# Patient Record
Sex: FEMALE | Race: WHITE | NOT HISPANIC OR LATINO | Employment: FULL TIME | URBAN - METROPOLITAN AREA
[De-identification: names, ages, dates, MRNs, and addresses within clinical notes are randomized per-mention and may not be internally consistent; named-entity substitution may affect disease eponyms.]

---

## 2017-02-10 DIAGNOSIS — Z30.41 SURVEILLANCE OF PREVIOUSLY PRESCRIBED CONTRACEPTIVE PILL: Primary | ICD-10-CM

## 2017-02-10 RX ORDER — ACETAMINOPHEN AND CODEINE PHOSPHATE 120; 12 MG/5ML; MG/5ML
1 SOLUTION ORAL DAILY
Qty: 90 TABLET | Refills: 3 | Status: SHIPPED
Start: 2017-02-10 | End: 2017-12-21

## 2017-02-10 NOTE — TELEPHONE ENCOUNTER
Received refill request for POP.  Last in clinic 12/2016 and PAP done.   Plan was for her to f/u in 3 months, considering Nuvaring or Nexplanon.  POP refill done per  protocol

## 2017-05-11 ENCOUNTER — APPOINTMENT (OUTPATIENT)
Dept: GENERAL RADIOLOGY | Facility: CLINIC | Age: 32
End: 2017-05-11
Attending: EMERGENCY MEDICINE
Payer: COMMERCIAL

## 2017-05-11 ENCOUNTER — HOSPITAL ENCOUNTER (EMERGENCY)
Facility: CLINIC | Age: 32
Discharge: HOME | End: 2017-05-11
Attending: EMERGENCY MEDICINE | Admitting: EMERGENCY MEDICINE
Payer: COMMERCIAL

## 2017-05-11 VITALS
HEIGHT: 65 IN | DIASTOLIC BLOOD PRESSURE: 84 MMHG | TEMPERATURE: 99.1 F | OXYGEN SATURATION: 100 % | RESPIRATION RATE: 14 BRPM | WEIGHT: 200 LBS | BODY MASS INDEX: 33.32 KG/M2 | SYSTOLIC BLOOD PRESSURE: 133 MMHG

## 2017-05-11 DIAGNOSIS — S42.002A CLOSED DISPLACED FRACTURE OF LEFT CLAVICLE, UNSPECIFIED PART OF CLAVICLE, INITIAL ENCOUNTER: ICD-10-CM

## 2017-05-11 PROCEDURE — 25000132 ZZH RX MED GY IP 250 OP 250 PS 637: Performed by: EMERGENCY MEDICINE

## 2017-05-11 PROCEDURE — 72040 X-RAY EXAM NECK SPINE 2-3 VW: CPT

## 2017-05-11 PROCEDURE — 73000 X-RAY EXAM OF COLLAR BONE: CPT | Mod: LT

## 2017-05-11 PROCEDURE — 73080 X-RAY EXAM OF ELBOW: CPT | Mod: LT

## 2017-05-11 PROCEDURE — 99285 EMERGENCY DEPT VISIT HI MDM: CPT

## 2017-05-11 RX ORDER — OXYCODONE AND ACETAMINOPHEN 5; 325 MG/1; MG/1
1-2 TABLET ORAL EVERY 4 HOURS PRN
Qty: 20 TABLET | Refills: 0 | Status: SHIPPED | OUTPATIENT
Start: 2017-05-11 | End: 2017-12-21

## 2017-05-11 RX ORDER — IBUPROFEN 600 MG/1
600 TABLET, FILM COATED ORAL ONCE
Status: COMPLETED | OUTPATIENT
Start: 2017-05-11 | End: 2017-05-11

## 2017-05-11 RX ADMIN — IBUPROFEN 600 MG: 600 TABLET ORAL at 20:32

## 2017-05-11 ASSESSMENT — ENCOUNTER SYMPTOMS
HEADACHES: 0
NECK PAIN: 1
ARTHRALGIAS: 1
WOUND: 1

## 2017-05-11 NOTE — LETTER
EMERGENCY DEPARTMENT  [address] [facility] [address]  [phone]    May 11, 2017    Stephy Daniel  [address]  [facility] [id number] [phone] (home)     : 1985      To Whom it may concern:    Stephy Daniel was seen in our Emergency Department today, May 11, 2017.  Please excuse her from work for the next 3 days.    Sincerely,        Susan Johnson

## 2017-05-11 NOTE — ED AVS SNAPSHOT
Emergency Department    [address] [place] [address]    Phone:  [phone]    Fax:  [phone]                                       Stephy Daniel   MRN: [id number]    Department:   Emergency Department   Date of Visit:  5/11/2017           After Visit Summary Signature Page     I have received my discharge instructions, and my questions have been answered. I have discussed any challenges I see with this plan with the nurse or doctor.    ..........................................................................................................................................  Patient/Patient Representative Signature      ..........................................................................................................................................  Patient Representative Print Name and Relationship to Patient    ..................................................               ................................................  Date                                            Time    ..........................................................................................................................................  Reviewed by Signature/Title    ...................................................              ..............................................  Date                                                            Time
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
## 2017-05-12 NOTE — DISCHARGE INSTRUCTIONS
Continue taking Ibuprofen as needed for pain.    Ice to your collar bone.    Wear sling until discussed with Dr. Davis.    Avoid lifting your left shoulder.    Opioid Medication Information    You have been given a prescription for an opioid (narcotic) pain medicine and/or have received a pain medicine while here in the Emergency Department. These medicines can make you drowsy or impaired. You must not drive, operate dangerous equipment, or engage in any other dangerous activities while taking these medications. If you drive while taking these medications, you could be arrested for DUI, or driving under the influence. Do not drink any alcohol while you are taking these medications.   Opioid pain medications can cause addiction. If you have a history of chemical dependency of any type, you are at a higher risk of becoming addicted to pain medications.  Only take these prescribed medications to treat your pain when all other options have been tried. Take it for as short a time and as few doses as possible. Store your pain pills in a secure place, as they are frequently stolen and provide a dangerous opportunity for children or visitors in your house to start abusing these powerful medications. We will not replace any lost or stolen medicine.  As soon as your pain is better, you should flush all your remaining medication.   Many prescription pain medications contain Tylenol  (acetaminophen), including Vicodin , Tylenol #3 , Norco , Lortab , and Percocet .  You should not take any extra pills of Tylenol  if you are using these prescription medications or you can get very sick.  Do not ever take more than 4000 mg of acetaminophen in any 24 hour period.  All opioids tend to cause constipation. Drink plenty of water and eat foods that have a lot of fiber, such as fruits, vegetables, prune juice, apple juice and high fiber cereal.  Take a laxative if you don t move your bowels at least every other day. Miralax , Milk of  Magnesia, Colace , or Senna  can be used to keep you regular.

## 2017-05-12 NOTE — ED NOTES
Patient fitted with large sling per order. No complaints.  Mackenzie BONE,.......................................... 5/11/2017   9:50 PM
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
## 2017-05-30 ENCOUNTER — OFFICE VISIT (OUTPATIENT)
Dept: FAMILY MEDICINE | Facility: CLINIC | Age: 32
End: 2017-05-30
Payer: COMMERCIAL

## 2017-05-30 VITALS
OXYGEN SATURATION: 96 % | HEART RATE: 78 BPM | WEIGHT: 201 LBS | BODY MASS INDEX: 33.49 KG/M2 | TEMPERATURE: 97.4 F | HEIGHT: 65 IN | RESPIRATION RATE: 14 BRPM | DIASTOLIC BLOOD PRESSURE: 66 MMHG | SYSTOLIC BLOOD PRESSURE: 106 MMHG

## 2017-05-30 DIAGNOSIS — S42.022D CLOSED DISPLACED FRACTURE OF SHAFT OF LEFT CLAVICLE WITH ROUTINE HEALING, SUBSEQUENT ENCOUNTER: ICD-10-CM

## 2017-05-30 DIAGNOSIS — Z01.818 PREOP GENERAL PHYSICAL EXAM: Primary | ICD-10-CM

## 2017-05-30 PROCEDURE — 99214 OFFICE O/P EST MOD 30 MIN: CPT | Performed by: FAMILY MEDICINE

## 2017-05-30 RX ORDER — MAGNESIUM CARBONATE
1 POWDER (GRAM) MISCELLANEOUS DAILY
COMMUNITY
End: 2019-12-20

## 2017-05-30 NOTE — NURSING NOTE
"Chief Complaint   Patient presents with     Establish Care     Pre-Op Exam     left clavicle fracture       Initial /66  Pulse 78  Temp 97.4  F (36.3  C) (Oral)  Resp 14  Ht 5' 5\" (1.651 m)  Wt 201 lb (91.2 kg)  SpO2 96%  Breastfeeding? No  BMI 33.45 kg/m2 Estimated body mass index is 33.45 kg/(m^2) as calculated from the following:    Height as of this encounter: 5' 5\" (1.651 m).    Weight as of this encounter: 201 lb (91.2 kg).  BP completed using cuff size: large    Health Maintenance that is potentially due pending provider review:  Health Maintenance Due   Topic Date Due     TETANUS IMMUNIZATION (SYSTEM ASSIGNED)  04/10/2003         Discuss Tdap with provider  "

## 2017-05-30 NOTE — MR AVS SNAPSHOT
After Visit Summary   5/30/2017    Stephy Daniel    MRN: [id number]           Patient Information     Date Of Birth          1985        Visit Information        Provider Department      5/30/2017 5:30 PM Barbara Marion MD [facility]        Today's Diagnoses     Preop general physical exam    -  1    Closed displaced fracture of shaft of left clavicle with routine healing, subsequent encounter          Care Instructions      Before Your Surgery      Call your surgeon if there is any change in your health. This includes signs of a cold or flu (such as a sore throat, runny nose, cough, rash or fever).    Do not smoke, drink alcohol or take over the counter medicine (unless your surgeon or primary care doctor tells you to) for the 24 hours before and after surgery.    If you take prescribed drugs: Follow your doctor s orders about which medicines to take and which to stop until after surgery.    Eating and drinking prior to surgery: follow the instructions from your surgeon    Take a shower or bath the night before surgery. Use the soap your surgeon gave you to gently clean your skin. If you do not have soap from your surgeon, use your regular soap. Do not shave or scrub the surgery site.  Wear clean pajamas and have clean sheets on your bed.           Follow-ups after your visit        Who to contact     If you have questions or need follow up information about today's clinic visit or your schedule please contact [employer] directly at [phone].  Normal or non-critical lab and imaging results will be communicated to you by MyChart, letter or phone within 4 business days after the clinic has received the results. If you do not hear from us within 7 days, please contact the clinic through [employer] or phone. If you have a critical or abnormal lab result, we will notify you by phone as soon as possible.  Submit refill requests through [employer] or call your pharmacy and  "they will forward the refill request to us. Please allow 3 business days for your refill to be completed.          Additional Information About Your Visit        [employer] Information     [employer] gives you secure access to your electronic health record. If you see a primary care provider, you can also send messages to your care team and make appointments. If you have questions, please call your primary care clinic.  If you do not have a primary care provider, please call [phone] and they will assist you.        Care EveryWhere ID     This is your Care EveryWhere ID. This could be used by other organizations to access your [place] medical records  [id number]        Your Vitals Were     Pulse Temperature Respirations Height Pulse Oximetry Breastfeeding?    78 97.4  F (36.3  C) (Oral) 14 5' 5\" (1.651 m) 96% No    BMI (Body Mass Index)                   33.45 kg/m2            Blood Pressure from Last 3 Encounters:   05/30/17 106/66   05/11/17 133/84   12/02/16 121/87    Weight from Last 3 Encounters:   05/30/17 201 lb (91.2 kg)   05/11/17 200 lb (90.7 kg)   12/02/16 206 lb 12.8 oz (93.8 kg)              Today, you had the following     No orders found for display       Primary Care Provider Office Phone #    [facility] [phone]       No address on file        Thank you!     Thank you for choosing [facility]  for your care. Our goal is always to provide you with excellent care. Hearing back from our patients is one way we can continue to improve our services. Please take a few minutes to complete the written survey that you may receive in the mail after your visit with us. Thank you!             Your Updated Medication List - Protect others around you: Learn how to safely use, store and throw away your medicines at www.disposemymeds.org.          This list is accurate as of: 5/30/17  6:10 PM.  Always use your most recent med list.                   Brand Name Dispense " Instructions for use    CALCIUM & VIT D3 BONE HEALTH PO      Take 1 tablet by mouth 2 times daily       Magnesium Carbonate Powd      Take 1 teaspoonful by mouth daily       MULTIVITAMIN ADULT PO          norethindrone 0.35 MG per tablet    MICRONOR    90 tablet    Take 1 tablet (0.35 mg) by mouth daily       oxyCODONE-acetaminophen 5-325 MG per tablet    PERCOCET    20 tablet    Take 1-2 tablets by mouth every 4 hours as needed for moderate to severe pain No driving a car or drinking alcohol for 6 hours after taking this medication.       VITAMIN C PO      Take 1 tablet by mouth daily       VITAMIN D (CHOLECALCIFEROL) PO      Take by mouth daily       ZINC CHELATED PO      Take 1 tablet by mouth daily
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
## 2017-12-20 ASSESSMENT — ENCOUNTER SYMPTOMS
DECREASED LIBIDO: 0
HOT FLASHES: 0

## 2017-12-20 ASSESSMENT — ANXIETY QUESTIONNAIRES
4. TROUBLE RELAXING: SEVERAL DAYS
1. FEELING NERVOUS, ANXIOUS, OR ON EDGE: SEVERAL DAYS
GAD7 TOTAL SCORE: 2
7. FEELING AFRAID AS IF SOMETHING AWFUL MIGHT HAPPEN: NOT AT ALL
2. NOT BEING ABLE TO STOP OR CONTROL WORRYING: NOT AT ALL
3. WORRYING TOO MUCH ABOUT DIFFERENT THINGS: NOT AT ALL
7. FEELING AFRAID AS IF SOMETHING AWFUL MIGHT HAPPEN: NOT AT ALL
6. BECOMING EASILY ANNOYED OR IRRITABLE: NOT AT ALL
GAD7 TOTAL SCORE: 2
5. BEING SO RESTLESS THAT IT IS HARD TO SIT STILL: NOT AT ALL

## 2017-12-21 ENCOUNTER — OFFICE VISIT (OUTPATIENT)
Dept: OBGYN | Facility: CLINIC | Age: 32
End: 2017-12-21
Attending: NURSE PRACTITIONER
Payer: COMMERCIAL

## 2017-12-21 VITALS
DIASTOLIC BLOOD PRESSURE: 81 MMHG | SYSTOLIC BLOOD PRESSURE: 117 MMHG | HEIGHT: 65 IN | HEART RATE: 72 BPM | WEIGHT: 201 LBS | BODY MASS INDEX: 33.49 KG/M2

## 2017-12-21 DIAGNOSIS — Z13.21 ENCOUNTER FOR VITAMIN DEFICIENCY SCREENING: ICD-10-CM

## 2017-12-21 DIAGNOSIS — Z13.29 SCREENING FOR THYROID DISORDER: ICD-10-CM

## 2017-12-21 DIAGNOSIS — Z13.220 SCREENING FOR LIPOID DISORDERS: ICD-10-CM

## 2017-12-21 DIAGNOSIS — Z11.3 SCREENING EXAMINATION FOR VENEREAL DISEASE: ICD-10-CM

## 2017-12-21 DIAGNOSIS — Z01.419 ENCOUNTER FOR GYNECOLOGICAL EXAMINATION WITHOUT ABNORMAL FINDING: ICD-10-CM

## 2017-12-21 DIAGNOSIS — Z13.1 SCREENING FOR DIABETES MELLITUS: ICD-10-CM

## 2017-12-21 DIAGNOSIS — Z12.4 SCREENING FOR MALIGNANT NEOPLASM OF CERVIX: ICD-10-CM

## 2017-12-21 DIAGNOSIS — Z30.41 SURVEILLANCE OF PREVIOUSLY PRESCRIBED CONTRACEPTIVE PILL: ICD-10-CM

## 2017-12-21 DIAGNOSIS — Z00.00 VISIT FOR PREVENTIVE HEALTH EXAMINATION: Primary | ICD-10-CM

## 2017-12-21 DIAGNOSIS — N92.1 METRORRHAGIA: ICD-10-CM

## 2017-12-21 LAB
CHOLEST SERPL-MCNC: 170 MG/DL
DEPRECATED CALCIDIOL+CALCIFEROL SERPL-MC: 21 UG/L (ref 20–75)
HBA1C MFR BLD: 5.1 % (ref 4.3–6)
HCG UR QL: NEGATIVE
HDLC SERPL-MCNC: 74 MG/DL
INTERNAL QC OK POCT: YES
LDLC SERPL CALC-MCNC: 77 MG/DL
NONHDLC SERPL-MCNC: 96 MG/DL
TRIGL SERPL-MCNC: 97 MG/DL
TSH SERPL DL<=0.005 MIU/L-ACNC: 2.29 MU/L (ref 0.4–4)

## 2017-12-21 PROCEDURE — 82306 VITAMIN D 25 HYDROXY: CPT | Performed by: NURSE PRACTITIONER

## 2017-12-21 PROCEDURE — 84443 ASSAY THYROID STIM HORMONE: CPT | Performed by: NURSE PRACTITIONER

## 2017-12-21 PROCEDURE — 81025 URINE PREGNANCY TEST: CPT | Mod: ZF | Performed by: NURSE PRACTITIONER

## 2017-12-21 PROCEDURE — 80061 LIPID PANEL: CPT | Performed by: NURSE PRACTITIONER

## 2017-12-21 PROCEDURE — 87491 CHLMYD TRACH DNA AMP PROBE: CPT | Performed by: NURSE PRACTITIONER

## 2017-12-21 PROCEDURE — 87624 HPV HI-RISK TYP POOLED RSLT: CPT | Performed by: NURSE PRACTITIONER

## 2017-12-21 PROCEDURE — 87591 N.GONORRHOEAE DNA AMP PROB: CPT | Performed by: NURSE PRACTITIONER

## 2017-12-21 PROCEDURE — 83036 HEMOGLOBIN GLYCOSYLATED A1C: CPT | Performed by: NURSE PRACTITIONER

## 2017-12-21 PROCEDURE — 99213 OFFICE O/P EST LOW 20 MIN: CPT | Mod: ZF

## 2017-12-21 PROCEDURE — 36415 COLL VENOUS BLD VENIPUNCTURE: CPT | Performed by: NURSE PRACTITIONER

## 2017-12-21 PROCEDURE — G0145 SCR C/V CYTO,THINLAYER,RESCR: HCPCS | Performed by: NURSE PRACTITIONER

## 2017-12-21 RX ORDER — ACETAMINOPHEN AND CODEINE PHOSPHATE 120; 12 MG/5ML; MG/5ML
1 SOLUTION ORAL DAILY
Qty: 90 TABLET | Refills: 3 | Status: SHIPPED | OUTPATIENT
Start: 2017-12-21 | End: 2018-12-26

## 2017-12-21 ASSESSMENT — ANXIETY QUESTIONNAIRES: GAD7 TOTAL SCORE: 2

## 2017-12-21 ASSESSMENT — PAIN SCALES - GENERAL: PAINLEVEL: NO PAIN (0)

## 2017-12-21 NOTE — PATIENT INSTRUCTIONS
Please go to the lab to have your preventative labs done today.  A pap smear and gonorrhea & chlamydia tests were done today.  Refill was provided for birth control pills.  Notify provider if spotting continues more than the next couple weeks.  Would consider ultrasound if labs come back normal and it does not resolve.        PREVENTIVE HEALTH RECOMMENDATIONS:   Most women need a yearly breast and pelvic exam.    A PAP screen, a test done DURING a pelvic exam, is NO longer recommended yearly.    March 2013, screening guidelines recommended by ACOG for PAP screen are:    1) First pap at age 21.    2) Pap every 3 years until age 30.    3) After age 30, pap every 3 years or Pap with HR HPV screen every 5 years until age 65.  4) Women do NOT need a vaginal Pap screen after a hysterectomy (surgical removal of the uterus) when they have not had cancer.    Exceptions:  1) Yearly pap if HIV+ or immunosuppressed secondary to organ transplant  2) JOSÉ II-III continue routine screening for 20 years.    I encourage you continue looking for opportunities to choose a healthy lifestyle:       * Choose to eat a heart healthy diet. Check out the FOOD PLATE guidelines at: http://www.choosemyplate.gov/ for helpful hints on weight and cholesterol management.  Balance your caloric intake with exercise to maintain a BMI in the 22 to 26 range. For bone health: Eat calcium-rich foods like yogurt, broccoli or take chewable calcium pills (500 to 600 mg) twice a day with food.       * Exercise for at least an average of 30 minutes a day, 5 days of the week. This will help you control your weight, release stress, and help prevent disease.      * Take a Vitamin D3 supplement daily fall through spring and during summer unless you [id number]' full body sun exposure to skin without sunscreen.      * DO wear sunscreen to prevent skin cancer after the first 15-30 minutes.      * Identify stressors in your life, find ways to release the stress, and,  make time for yourself. PLEASE ask for help if mood changes last longer than two weeks.     * Limit alcohol to one drink per day.  No smoking.  Avoid second hand smoke. If you smoke, ask for help to stop.       *  If you are in a sexual relationship, talk with your partner about possible infection risks and take action to protect yourself from exposure to a sexual infection.    Please request an infection screen for STIs (sexually transmitted infections) if you are less than age 26 OR believe that you may be at risk.     Get a flu shot each year. Get a tetanus shot every 10 years. EVERYONE needs a pertussis (Whooping cough) booster.    See your dentist twice a year for an exam and preventive care cleaning.     Consider the following screen tests:    1) cholesterol test every 5 years.     2) yearly mammogram after age 40 unless you have identified risks.    3) colonoscopy every 10 years after age 50 unless you have identified risks.    4) diabetes blood test screening if you are at risk for diabetes.      Additional information that you may also find helpful:  The Internet now gives us access to LOTS of information -- some of it helpful, research documented and also plenty of harmful, anecdotal information that may not pertain to your situtaion. Consider visiting the following websites for accurate health information:    www.vitamindcouncil.org/ : Info and ongoing research re Vitamin D    www.fairview.org : Up to date and easily searchable information on multiple topics.    www.medlineplus.gov : medication info, interactive tutorials, watch real surgeries online    www.cdc.gov : public health info, travel advisories, epidemics (H1N1)    www.natalia/std.org: current research re diagnosis, treatment and prevention of sexually contacted infections.    www.health.[facility].[place] : [place] dept of heatlh, public health issues in [place], N1N1    www.familydoctor.org : good info from the Academy of Family Physicians

## 2017-12-21 NOTE — PROGRESS NOTES
Progress Note    SUBJECTIVE:  Stephy Daniel is an 32 year old, , who requests an Annual Preventive Exam.     Concerns today include:   1. Spotting: Had a regular, normal period ; Traveled at the end of November and states she did adjust the time that she was taking the POPs in accordance with the time zone change; had spotting  and . Has been on POPs since 2017.  Doesn't have trouble remembering to take them at the same time everyday. Denies changes in hair or nails or energy level.  Does report increased stress at work.  No significant change in exercise or diet. She had chosen POPs because she wanted a low level of hormone.     Sexual Hx:  - Male partners; 1 partner in the last year  - Denies any concerns or dyspareunia    Menstrual History:  Menstrual History 2017   LAST MENSTRUAL PERIOD - 2017 -   Period Cycle (Days) 35-40 - 28   Period Duration (Days) 6 - spotting in between   Method of Contraception None - Combined oral contraceptive   Period Pattern Irregular - Irregular   Menstrual Flow - - Heavy;Light   Menstrual Control - - Tampon;Other (Comment)   Dysmenorrhea - - Mild   PMS Symptoms - - Cramping   Reviewed Today Yes - Yes     Last pap smear: 2016 NIL, Other HR HPV positive    History of abnormal Pap smear: yes  - 10/2012 LSIL  - 2013 Colposcopy --> JOSÉ 1  - 2014 NIL    Mammogram current: n/a  Last Colonoscopy: n.a    Exercise: yoga 1-2 times per week; walks daily with dogs; bikes to work when nice outside  Diet: vegetarian; tries to be mindful of the amount of calcium she is getting, ~2 servings per day    Social Hx:  - Office job, sits most of the day    Lipids and glucose: done several yrs ago; records not available    HISTORY:    Current Outpatient Prescriptions on File Prior to Visit:  Magnesium Carbonate POWD Take 1 teaspoonful by mouth daily   ZINC CHELATED PO Take 1 tablet by mouth daily   Ascorbic Acid  (VITAMIN C PO) Take 1 tablet by mouth daily   Multiple Vitamins-Minerals (MULTIVITAMIN ADULT PO)    VITAMIN D, CHOLECALCIFEROL, PO Take by mouth daily     No current facility-administered medications on file prior to visit.   Allergies   Allergen Reactions     Amoxicillin Rash       There is no immunization history on file for this patient.    Obstetric History       T0      L0     SAB0   TAB0   Ectopic0   Multiple0   Live Births0      Past Medical History:   Diagnosis Date     NO ACTIVE PROBLEMS      Past Surgical History:   Procedure Laterality Date     ORTHOPEDIC SURGERY  2017    clavicle fracture, internal fixation with metal plate     TONSILLECTOMY       Family History   Problem Relation Age of Onset     Breast Cancer No family hx of      Colon Cancer No family hx of      DIABETES No family hx of      Thyroid Disease No family hx of      Social History     Social History     Marital status: Single     Spouse name: N/A     Number of children: N/A     Years of education: N/A     Social History Main Topics     Smoking status: Never Smoker     Smokeless tobacco: Never Used     Alcohol use 0.6 - 1.2 oz/week     1 - 2 Standard drinks or equivalent per week     Drug use: No     Sexual activity: Yes     Partners: Male     Birth control/ protection: Pill       Social History Narrative    How much exercise per week? 3 days    How much calcium per day? In foods        How much caffeine per day? none    How much vitamin D per day? supplement    Do you/your family wear seatbelts?  Yes    Do you/your family use safety helmets? Yes    Do you/your family use sunscreen? Yes    Do you/your family keep firearms in the home? No    Do you/your family have a smoke detector(s)? 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    No flowsheet data found.  CARLEE-7 SCORE 2017   Total Score 2 (minimal anxiety)   Total Score 2  "    Answers for HPI/ROS submitted by the patient on 12/20/2017   CARLEE 7 TOTAL SCORE: 2  PHQ-2 Score: 0    Denies IPV; feels safe.     EXAM:  Blood pressure 117/81, pulse 72, height 1.638 m (5' 4.5\"), weight 91.2 kg (201 lb), last menstrual period 11/14/2017, not currently breastfeeding. Body mass index is 33.97 kg/(m^2).  General - pleasant female in no acute distress.  Skin - no suspicious lesions or rashes  EENT-  PERRLA, euthyroid with out palpable nodules  Neck - supple without lymphadenopathy.  Lungs - clear to auscultation bilaterally.  Heart - regular rate and rhythm without murmur.  Abdomen - soft, nontender, nondistended, no masses or organomegaly noted.  Musculoskeletal - no gross deformities.  Neurological - normal strength, sensation, and mental status.    Breast Exam:  Breast: Without visible skin changes. No dimpling or lesions seen.   Breasts supple, non-tender with palpation, no dominant mass, nodularity, or nipple discharge noted bilaterally. Axillary nodes negative.      Pelvic Exam:  EG/BUS: Normal genital architecture without lesions, erythema or abnormal secretions; Bartholin's, Urethra, [place]'s normal   Urethral meatus: normal   Urethra: no masses, tenderness, or scarring   Bladder: no masses or tenderness   Vagina: moist, pink, rugae with creamy, white and odorless secretions  Cervix: pink, moist, closed, without lesion or CMT  Uterus: anteverted,  and small, smooth, firm, mobile w/o pain  Adnexa: Within normal limits and No masses, nodularity, tenderness  Rectum: anus normal     UPT: negative    ASSESSMENT:  Encounter Diagnoses   Name Primary?     Surveillance of previously prescribed contraceptive pill      Visit for preventive health examination Yes     Screening for diabetes mellitus      Screening for lipoid disorders      Screening examination for venereal disease      Encounter for vitamin deficiency screening      Screening for malignant neoplasm of cervix      Encounter for " gynecological examination without abnormal finding      Screening for thyroid disorder      Metrorrhagia         PLAN:   Orders Placed This Encounter   Procedures     Pelvic and Breast Exam Procedure []     Pap Smear Exam [] Do Not Remove     Pap imaged thin layer screen with HPV - recommended age 30 - 65 years (select HPV order below)     HPV High Risk Types DNA Cervical     Hemoglobin A1c [LAB90]     Fasting Glucose [RMV8227]     Lipid panel reflex to direct LDL Fasting     TSH with free T4 reflex     25- OH-Vitamin D     hCG qual urine POCT     Orders Placed This Encounter   Medications     norethindrone (MICRONOR) 0.35 MG per tablet     Sig: Take 1 tablet (0.35 mg) by mouth daily     Dispense:  90 tablet     Refill:  3     Preventative Health:  - Patient to go to the lab to have preventative health labs done today; patient is fasting  - A pap smear was done today  - Gonorrhea & chlamydia tests were completed today; patient declined other STD tests  - Declines flu vaccine    Contraception & Spotting:  - Reviewed contraceptive options with Stephy.  She desires to continue taking the POPs.  Refill was provided.  Instructed patient to notify provider if she continues to note spotting between her periods.  Would consider ultrasound if lab results are normal and spotting does not resolve within the next couple weeks.      Additional teaching done at this visit regarding calcium (1200 mg per day), self breast exam, exercise, birth control, mental health and weight/diet.  Return to clinic in one year.  Follow-up as needed.    Kaila Hewitt, DNP, APRN, WHNP

## 2017-12-21 NOTE — LETTER
2017       RE: Stephy Daniel  [address]  [facility] [address]     Dear Colleague,    Thank you for referring your patient, Stephy Daniel, to the [facility] at [facility]. Please see a copy of my visit note below.      Progress Note    SUBJECTIVE:  Stephy Daniel is an 32 year old, , who requests an Annual Preventive Exam.     Concerns today include:   1. Spotting: Had a regular, normal period ; Traveled at the end of November and states she did adjust the time that she was taking the POPs in accordance with the time zone change; had spotting  and . Has been on POPs since 2017.  Doesn't have trouble remembering to take them at the same time everyday. Denies changes in hair or nails or energy level.  Does report increased stress at work.  No significant change in exercise or diet. She had chosen POPs because she wanted a low level of hormone.     Sexual Hx:  - Male partners; 1 partner in the last year  - Denies any concerns or dyspareunia    Menstrual History:  Menstrual History 2017   LAST MENSTRUAL PERIOD - 2017 -   Period Cycle (Days) 35-40 - 28   Period Duration (Days) 6 - spotting in between   Method of Contraception None - Combined oral contraceptive   Period Pattern Irregular - Irregular   Menstrual Flow - - Heavy;Light   Menstrual Control - - Tampon;Other (Comment)   Dysmenorrhea - - Mild   PMS Symptoms - - Cramping   Reviewed Today Yes - Yes     Last pap smear: 2016 NIL, Other HR HPV positive    History of abnormal Pap smear: yes  - 10/2012 LSIL  - 2013 Colposcopy --> JOSÉ 1  - 2014 NIL    Mammogram current: n/a  Last Colonoscopy: n.a    Exercise: yoga 1-2 times per week; walks daily with dogs; bikes to work when nice outside  Diet: vegetarian; tries to be mindful of the amount of calcium she is getting, ~2 servings per day    Social Hx:  -  Office job, sits most of the day    Lipids and glucose: done several yrs ago; records not available    HISTORY:    Current Outpatient Prescriptions on File Prior to Visit:  Magnesium Carbonate POWD Take 1 teaspoonful by mouth daily   ZINC CHELATED PO Take 1 tablet by mouth daily   Ascorbic Acid (VITAMIN C PO) Take 1 tablet by mouth daily   Multiple Vitamins-Minerals (MULTIVITAMIN ADULT PO)    VITAMIN D, CHOLECALCIFEROL, PO Take by mouth daily     No current facility-administered medications on file prior to visit.   Allergies   Allergen Reactions     Amoxicillin Rash       There is no immunization history on file for this patient.    Obstetric History       T0      L0     SAB0   TAB0   Ectopic0   Multiple0   Live Births0      Past Medical History:   Diagnosis Date     NO ACTIVE PROBLEMS      Past Surgical History:   Procedure Laterality Date     ORTHOPEDIC SURGERY  2017    clavicle fracture, internal fixation with metal plate     TONSILLECTOMY       Family History   Problem Relation Age of Onset     Breast Cancer No family hx of      Colon Cancer No family hx of      DIABETES No family hx of      Thyroid Disease No family hx of      Social History     Social History     Marital status: Single     Spouse name: N/A     Number of children: N/A     Years of education: N/A     Social History Main Topics     Smoking status: Never Smoker     Smokeless tobacco: Never Used     Alcohol use 0.6 - 1.2 oz/week     1 - 2 Standard drinks or equivalent per week     Drug use: No     Sexual activity: Yes     Partners: Male     Birth control/ protection: Pill       Social History Narrative    How much exercise per week? 3 days    How much calcium per day? In foods        How much caffeine per day? none    How much vitamin D per day? supplement    Do you/your family wear seatbelts?  Yes    Do you/your family use safety helmets? Yes    Do you/your family use sunscreen? Yes    Do you/your family keep firearms in the home?  "No    Do you/your family have a smoke detector(s)? Yes        December 21, 2017 Pam Houser LIZZETHN           ROS   ROS: 10 point ROS neg other than the symptoms noted above in the HPI.    No flowsheet data found.  CARLEE-7 SCORE 12/20/2017   Total Score 2 (minimal anxiety)   Total Score 2     Answers for HPI/ROS submitted by the patient on 12/20/2017   CARLEE 7 TOTAL SCORE: 2  PHQ-2 Score: 0    Denies IPV; feels safe.     EXAM:  Blood pressure 117/81, pulse 72, height 1.638 m (5' 4.5\"), weight 91.2 kg (201 lb), last menstrual period 11/14/2017, not currently breastfeeding. Body mass index is 33.97 kg/(m^2).  General - pleasant female in no acute distress.  Skin - no suspicious lesions or rashes  EENT-  PERRLA, euthyroid with out palpable nodules  Neck - supple without lymphadenopathy.  Lungs - clear to auscultation bilaterally.  Heart - regular rate and rhythm without murmur.  Abdomen - soft, nontender, nondistended, no masses or organomegaly noted.  Musculoskeletal - no gross deformities.  Neurological - normal strength, sensation, and mental status.    Breast Exam:  Breast: Without visible skin changes. No dimpling or lesions seen.   Breasts supple, non-tender with palpation, no dominant mass, nodularity, or nipple discharge noted bilaterally. Axillary nodes negative.      Pelvic Exam:  EG/BUS: Normal genital architecture without lesions, erythema or abnormal secretions; Bartholin's, Urethra, [place]'s normal   Urethral meatus: normal   Urethra: no masses, tenderness, or scarring   Bladder: no masses or tenderness   Vagina: moist, pink, rugae with creamy, white and odorless secretions  Cervix: pink, moist, closed, without lesion or CMT  Uterus: anteverted,  and small, smooth, firm, mobile w/o pain  Adnexa: Within normal limits and No masses, nodularity, tenderness  Rectum: anus normal     UPT: negative    ASSESSMENT:  Encounter Diagnoses   Name Primary?     Surveillance of previously prescribed contraceptive pill      Visit " for preventive health examination Yes     Screening for diabetes mellitus      Screening for lipoid disorders      Screening examination for venereal disease      Encounter for vitamin deficiency screening      Screening for malignant neoplasm of cervix      Encounter for gynecological examination without abnormal finding      Screening for thyroid disorder      Metrorrhagia         PLAN:   Orders Placed This Encounter   Procedures     Pelvic and Breast Exam Procedure []     Pap Smear Exam [] Do Not Remove     Pap imaged thin layer screen with HPV - recommended age 30 - 65 years (select HPV order below)     HPV High Risk Types DNA Cervical     Hemoglobin A1c [LAB90]     Fasting Glucose [TGU7180]     Lipid panel reflex to direct LDL Fasting     TSH with free T4 reflex     25- OH-Vitamin D     hCG qual urine POCT     Orders Placed This Encounter   Medications     norethindrone (MICRONOR) 0.35 MG per tablet     Sig: Take 1 tablet (0.35 mg) by mouth daily     Dispense:  90 tablet     Refill:  3     Preventative Health:  - Patient to go to the lab to have preventative health labs done today; patient is fasting  - A pap smear was done today  - Gonorrhea & chlamydia tests were completed today; patient declined other STD tests  - Declines flu vaccine    Contraception & Spotting:  - Reviewed contraceptive options with Stephy.  She desires to continue taking the POPs.  Refill was provided.  Instructed patient to notify provider if she continues to note spotting between her periods.  Would consider ultrasound if lab results are normal and spotting does not resolve within the next couple weeks.      Additional teaching done at this visit regarding calcium (1200 mg per day), self breast exam, exercise, birth control, mental health and weight/diet.  Return to clinic in one year.  Follow-up as needed.    Kaila Hewitt, DNP, APRN, WHNP

## 2017-12-21 NOTE — MR AVS SNAPSHOT
After Visit Summary   12/21/2017    Stephy Daniel    MRN: [id number]           Patient Information     Date Of Birth          1985        Visit Information        Provider Department      12/21/2017 9:00 AM Kaila Hewitt APRN CNP [facility]        Today's Diagnoses     Surveillance of previously prescribed contraceptive pill        Visit for preventive health examination        Screening for diabetes mellitus        Screening for lipoid disorders        Screening examination for venereal disease        Encounter for vitamin deficiency screening        Screening for malignant neoplasm of cervix        Encounter for gynecological examination without abnormal finding        Screening for thyroid disorder          Care Instructions    Please go to the lab to have your preventative labs done today.  A pap smear and gonorrhea & chlamydia tests were done today.  Refill was provided for birth control pills.  Notify provider if spotting continues more than the next couple weeks.  Would consider ultrasound if labs come back normal and it does not resolve.        PREVENTIVE HEALTH RECOMMENDATIONS:   Most women need a yearly breast and pelvic exam.    A PAP screen, a test done DURING a pelvic exam, is NO longer recommended yearly.    March 2013, screening guidelines recommended by ACOG for PAP screen are:    1) First pap at age 21.    2) Pap every 3 years until age 30.    3) After age 30, pap every 3 years or Pap with HR HPV screen every 5 years until age 65.  4) Women do NOT need a vaginal Pap screen after a hysterectomy (surgical removal of the uterus) when they have not had cancer.    Exceptions:  1) Yearly pap if HIV+ or immunosuppressed secondary to organ transplant  2) JOSÉ II-III continue routine screening for 20 years.    I encourage you continue looking for opportunities to choose a healthy lifestyle:       * Choose to eat a heart healthy diet. Check out the FOOD PLATE  guidelines at: http://www.choosemyplate.gov/ for helpful hints on weight and cholesterol management.  Balance your caloric intake with exercise to maintain a BMI in the 22 to 26 range. For bone health: Eat calcium-rich foods like yogurt, broccoli or take chewable calcium pills (500 to 600 mg) twice a day with food.       * Exercise for at least an average of 30 minutes a day, 5 days of the week. This will help you control your weight, release stress, and help prevent disease.      * Take a Vitamin D3 supplement daily fall through spring and during summer unless you [id number]' full body sun exposure to skin without sunscreen.      * DO wear sunscreen to prevent skin cancer after the first 15-30 minutes.      * Identify stressors in your life, find ways to release the stress, and, make time for yourself. PLEASE ask for help if mood changes last longer than two weeks.     * Limit alcohol to one drink per day.  No smoking.  Avoid second hand smoke. If you smoke, ask for help to stop.       *  If you are in a sexual relationship, talk with your partner about possible infection risks and take action to protect yourself from exposure to a sexual infection.    Please request an infection screen for STIs (sexually transmitted infections) if you are less than age 26 OR believe that you may be at risk.     Get a flu shot each year. Get a tetanus shot every 10 years. EVERYONE needs a pertussis (Whooping cough) booster.    See your dentist twice a year for an exam and preventive care cleaning.     Consider the following screen tests:    1) cholesterol test every 5 years.     2) yearly mammogram after age 40 unless you have identified risks.    3) colonoscopy every 10 years after age 50 unless you have identified risks.    4) diabetes blood test screening if you are at risk for diabetes.      Additional information that you may also find helpful:  The Internet now gives us access to LOTS of information -- some of it helpful,  research documented and also plenty of harmful, anecdotal information that may not pertain to your situtaion. Consider visiting the following websites for accurate health information:    www.vitamindcouncil.org/ : Info and ongoing research re Vitamin D    www.fairview.org : Up to date and easily searchable information on multiple topics.    www.medlineplus.gov : medication info, interactive tutorials, watch real surgeries online    www.cdc.gov : public health info, travel advisories, epidemics (H1N1)    www.natalia/std.org: current research re diagnosis, treatment and prevention of sexually contacted infections.    www.health[place]state[place] : [place] dept of heat, public health issues in [place], N1N1    www.familydoctor.org : good info from the Academy of Family Physicians                Follow-ups after your visit        Follow-up notes from your care team     Return in 1 year (on 12/21/2018) for Preventative Health Visit.      Future tests that were ordered for you today     Open Future Orders        Priority Expected Expires Ordered    Fasting Glucose [TCE2946] Today  12/21/2018 12/21/2017            Who to contact     Please call your clinic at [phone] to:    Ask questions about your health    Make or cancel appointments    Discuss your medicines    Learn about your test results    Speak to your doctor   If you have compliments or concerns about an experience at your clinic, or if you wish to file a complaint, please contact [facility] Patient Relations at [phone] or email us at [email or web address] [email or web address]         Additional Information About Your Visit        MyChart Information     [employer] gives you secure access to your electronic health record. If you see a primary care provider, you can also send messages to your care team and make appointments. If you have questions, please call your primary care clinic.  If you do not have a primary care provider, please call [phone] and  "they will assist you.      [employer] is an electronic gateway that provides easy, online access to your medical records. With [employer], you can request a clinic appointment, read your test results, renew a prescription or communicate with your care team.     To access your existing account, please contact your [facility] or call [phone] for assistance.        Care EveryWhere ID     This is your Care EveryWhere ID. This could be used by other organizations to access your [facility] medical records  [id number]        Your Vitals Were     Pulse Height Last Period BMI (Body Mass Index)          72 1.638 m (5' 4.5\") 11/14/2017 33.97 kg/m2         Blood Pressure from Last 3 Encounters:   12/21/17 117/81   05/30/17 106/66   05/11/17 133/84    Weight from Last 3 Encounters:   12/21/17 91.2 kg (201 lb)   05/30/17 91.2 kg (201 lb)   05/11/17 90.7 kg (200 lb)              We Performed the Following     25- OH-Vitamin D     Chlamydia trachomatis PCR Swab      Hemoglobin A1c [LAB90]     HPV High Risk Types DNA Cervical     Lipid panel reflex to direct LDL Fasting     Neisseria gonorrhoeae PCR Swab [[id number]]     Pap imaged thin layer screen with HPV - recommended age 30 - 65 years (select HPV order below)     Pap Smear Exam [] Do Not Remove     Pelvic and Breast Exam Procedure []     TSH with free T4 reflex        Primary Care Provider Office Phone # Fax #    [facility] [phone]       [address], [address]  [facility] [address]        Equal Access to Services     CORNEL WRIGHT : Hadii aad ku hadasho Soyamiletali, waaxda luqadaha, qaybta kaalmada pattie landry. So [facility] [phone].    ATENCIÓN: Si habla español, tiene a hoang disposición servicios gratuitos de asistencia lingüística. Llame al [phone].    We comply with applicable federal civil rights laws and [place] laws. We do not discriminate on the basis of " race, color, national origin, age, disability, sex, sexual orientation, or gender identity.            Thank you!     Thank you for choosing [facility]  for your care. Our goal is always to provide you with excellent care. Hearing back from our patients is one way we can continue to improve our services. Please take a few minutes to complete the written survey that you may receive in the mail after your visit with us. Thank you!             Your Updated Medication List - Protect others around you: Learn how to safely use, store and throw away your medicines at www.disposemymeds.org.          This list is accurate as of: 12/21/17  9:46 AM.  Always use your most recent med list.                   Brand Name Dispense Instructions for use Diagnosis    Magnesium Carbonate Powd      Take 1 teaspoonful by mouth daily        MULTIVITAMIN ADULT PO           norethindrone 0.35 MG per tablet    MICRONOR    90 tablet    Take 1 tablet (0.35 mg) by mouth daily    Surveillance of previously prescribed contraceptive pill       VITAMIN C PO      Take 1 tablet by mouth daily        VITAMIN D (CHOLECALCIFEROL) PO      Take by mouth daily        ZINC CHELATED PO      Take 1 tablet by mouth daily

## 2017-12-22 ENCOUNTER — TELEPHONE (OUTPATIENT)
Dept: OBGYN | Facility: CLINIC | Age: 32
End: 2017-12-22

## 2017-12-22 DIAGNOSIS — Z20.2 POTENTIAL EXPOSURE TO STD: ICD-10-CM

## 2017-12-22 DIAGNOSIS — A74.9 CHLAMYDIA INFECTION: Primary | ICD-10-CM

## 2017-12-22 LAB
C TRACH DNA SPEC QL NAA+PROBE: POSITIVE
N GONORRHOEA DNA SPEC QL NAA+PROBE: NEGATIVE
SPECIMEN SOURCE: ABNORMAL
SPECIMEN SOURCE: NORMAL

## 2017-12-22 RX ORDER — AZITHROMYCIN 500 MG/1
1000 TABLET, FILM COATED ORAL ONCE
Qty: 2 TABLET | Refills: 1 | Status: SHIPPED | OUTPATIENT
Start: 2017-12-22 | End: 2017-12-22

## 2017-12-22 NOTE — TELEPHONE ENCOUNTER
Received callback from Stephy asking for more STD testing since she came back positive for chlamydia.    Advised that a note will be sent to Kaila Hewitt to place orders. Once placed, she can go to any  Clinic or Hospital lab for completion.    She indicated understanding and had no further questions.

## 2017-12-22 NOTE — TELEPHONE ENCOUNTER
Received call from Jaquelin at microbiology lab that pt is positive for chlamydia.    Spoke with Stephy and provided result and education on what chlamydia is, how you get it, and how its treated. Answered all questions.  She reports having only 1 male partner in the last 5 years. Discussed abstaining from sexual intercourse for 7 days after the last partner has been treated. Partner treatment sent.     MD form completed and sent.

## 2017-12-26 NOTE — TELEPHONE ENCOUNTER
Kaila Hewitt agreed to order labs. Labs entered. Left message for patient to go to [place] lab to have them done.

## 2017-12-28 LAB
COPATH REPORT: NORMAL
PAP: NORMAL

## 2017-12-29 DIAGNOSIS — Z20.2 POTENTIAL EXPOSURE TO STD: ICD-10-CM

## 2017-12-29 DIAGNOSIS — Z13.1 SCREENING FOR DIABETES MELLITUS: ICD-10-CM

## 2017-12-29 LAB — GLUCOSE SERPL-MCNC: 90 MG/DL (ref 70–99)

## 2017-12-29 PROCEDURE — 86803 HEPATITIS C AB TEST: CPT | Performed by: NURSE PRACTITIONER

## 2017-12-29 PROCEDURE — 87389 HIV-1 AG W/HIV-1&-2 AB AG IA: CPT | Performed by: NURSE PRACTITIONER

## 2017-12-29 PROCEDURE — 86696 HERPES SIMPLEX TYPE 2 TEST: CPT | Performed by: NURSE PRACTITIONER

## 2017-12-29 PROCEDURE — 36415 COLL VENOUS BLD VENIPUNCTURE: CPT | Performed by: NURSE PRACTITIONER

## 2017-12-29 PROCEDURE — 86695 HERPES SIMPLEX TYPE 1 TEST: CPT | Performed by: NURSE PRACTITIONER

## 2017-12-29 PROCEDURE — 86780 TREPONEMA PALLIDUM: CPT | Performed by: NURSE PRACTITIONER

## 2017-12-29 PROCEDURE — 82947 ASSAY GLUCOSE BLOOD QUANT: CPT | Performed by: NURSE PRACTITIONER

## 2017-12-29 PROCEDURE — 86706 HEP B SURFACE ANTIBODY: CPT | Performed by: NURSE PRACTITIONER

## 2017-12-30 LAB — T PALLIDUM IGG+IGM SER QL: NEGATIVE

## 2018-01-02 LAB
FINAL DIAGNOSIS: NORMAL
HBV SURFACE AB SERPL IA-ACNC: 279.01 M[IU]/ML
HCV AB SERPL QL IA: NONREACTIVE
HIV 1+2 AB+HIV1 P24 AG SERPL QL IA: NONREACTIVE
HPV HR 12 DNA CVX QL NAA+PROBE: NEGATIVE
HPV16 DNA SPEC QL NAA+PROBE: NEGATIVE
HPV18 DNA SPEC QL NAA+PROBE: NEGATIVE
HSV1 IGG SERPL QL IA: <0.2 AI (ref 0–0.8)
HSV2 IGG SERPL QL IA: <0.2 AI (ref 0–0.8)
SPECIMEN DESCRIPTION: NORMAL

## 2018-12-12 ASSESSMENT — ANXIETY QUESTIONNAIRES
3. WORRYING TOO MUCH ABOUT DIFFERENT THINGS: NOT AT ALL
1. FEELING NERVOUS, ANXIOUS, OR ON EDGE: NOT AT ALL
GAD7 TOTAL SCORE: 0
7. FEELING AFRAID AS IF SOMETHING AWFUL MIGHT HAPPEN: NOT AT ALL
GAD7 TOTAL SCORE: 0
6. BECOMING EASILY ANNOYED OR IRRITABLE: NOT AT ALL
7. FEELING AFRAID AS IF SOMETHING AWFUL MIGHT HAPPEN: NOT AT ALL
4. TROUBLE RELAXING: NOT AT ALL
5. BEING SO RESTLESS THAT IT IS HARD TO SIT STILL: NOT AT ALL
2. NOT BEING ABLE TO STOP OR CONTROL WORRYING: NOT AT ALL

## 2018-12-12 ASSESSMENT — ENCOUNTER SYMPTOMS
SKIN CHANGES: 0
NAIL CHANGES: 0
POOR WOUND HEALING: 0

## 2018-12-13 ASSESSMENT — ANXIETY QUESTIONNAIRES: GAD7 TOTAL SCORE: 0

## 2018-12-26 ENCOUNTER — OFFICE VISIT (OUTPATIENT)
Dept: OBGYN | Facility: CLINIC | Age: 33
End: 2018-12-26
Attending: NURSE PRACTITIONER
Payer: COMMERCIAL

## 2018-12-26 VITALS
SYSTOLIC BLOOD PRESSURE: 111 MMHG | HEIGHT: 65 IN | HEART RATE: 66 BPM | BODY MASS INDEX: 34.04 KG/M2 | WEIGHT: 204.3 LBS | DIASTOLIC BLOOD PRESSURE: 76 MMHG

## 2018-12-26 DIAGNOSIS — R21 RASH: ICD-10-CM

## 2018-12-26 DIAGNOSIS — Z86.19 HX OF CHLAMYDIA INFECTION: ICD-10-CM

## 2018-12-26 DIAGNOSIS — Z00.00 VISIT FOR PREVENTIVE HEALTH EXAMINATION: Primary | ICD-10-CM

## 2018-12-26 DIAGNOSIS — Z11.3 SCREENING EXAMINATION FOR VENEREAL DISEASE: ICD-10-CM

## 2018-12-26 PROCEDURE — 87491 CHLMYD TRACH DNA AMP PROBE: CPT | Performed by: NURSE PRACTITIONER

## 2018-12-26 PROCEDURE — G0463 HOSPITAL OUTPT CLINIC VISIT: HCPCS | Mod: ZF

## 2018-12-26 PROCEDURE — 87591 N.GONORRHOEAE DNA AMP PROB: CPT | Performed by: NURSE PRACTITIONER

## 2018-12-26 ASSESSMENT — PATIENT HEALTH QUESTIONNAIRE - PHQ9
SUM OF ALL RESPONSES TO PHQ QUESTIONS 1-9: 0
5. POOR APPETITE OR OVEREATING: NOT AT ALL

## 2018-12-26 ASSESSMENT — ANXIETY QUESTIONNAIRES
7. FEELING AFRAID AS IF SOMETHING AWFUL MIGHT HAPPEN: NOT AT ALL
2. NOT BEING ABLE TO STOP OR CONTROL WORRYING: NOT AT ALL
5. BEING SO RESTLESS THAT IT IS HARD TO SIT STILL: NOT AT ALL
6. BECOMING EASILY ANNOYED OR IRRITABLE: NOT AT ALL
1. FEELING NERVOUS, ANXIOUS, OR ON EDGE: NOT AT ALL
3. WORRYING TOO MUCH ABOUT DIFFERENT THINGS: NOT AT ALL
GAD7 TOTAL SCORE: 0

## 2018-12-26 ASSESSMENT — MIFFLIN-ST. JEOR: SCORE: 1632.58

## 2018-12-26 ASSESSMENT — PAIN SCALES - GENERAL: PAINLEVEL: NO PAIN (0)

## 2018-12-26 NOTE — PATIENT INSTRUCTIONS
Start prenatal vitamin daily  Schedule an appointment with Dr. Dorman, dermatologist at the  or by calling [phone].    Next pap smear is due 12/2020.       PREVENTIVE HEALTH RECOMMENDATIONS:   Most women need a yearly breast and pelvic exam.    A PAP screen, a test done DURING a pelvic exam, is NO longer recommended yearly.    March 2013, screening guidelines recommended by ACOG for PAP screen are:    1) First pap at age 21.    2) Pap every 3 years until age 30.    3) After age 30, pap every 3 years or Pap with HR HPV screen every 5 years until age 65.  4) Women do NOT need a vaginal Pap screen after a hysterectomy (surgical removal of the uterus) when they have not had cancer.    Exceptions:  1) Yearly pap if HIV+ or immunosuppressed secondary to organ transplant  2) JOSÉ II-III continue routine screening for 20 years.    I encourage you continue looking for opportunities to choose a healthy lifestyle:       * Choose to eat a heart healthy diet. Check out the FOOD PLATE guidelines at: http://www.chooseCollege of Nursing and Health Sciences (CNHS)plate.gov/ for helpful hints on weight and cholesterol management.  Balance your caloric intake with exercise to maintain a BMI in the 22 to 26 range. For bone health: Eat calcium-rich foods like yogurt, broccoli or take chewable calcium pills (500 to 600 mg) twice a day with food.       * Exercise for at least an average of 30 minutes a day, 5 days of the week. This will help you control your weight, release stress, and help prevent disease.      * Take a Vitamin D3 supplement daily fall through spring and during summer unless you [id number]' full body sun exposure to skin without sunscreen.      * DO wear sunscreen to prevent skin cancer after the first 15-30 minutes.      * Identify stressors in your life, find ways to release the stress, and, make time for yourself. PLEASE ask for help if mood changes last longer than two weeks.     * Limit alcohol to one drink per day.  No smoking.  Avoid second  hand smoke. If you smoke, ask for help to stop.       *  If you are in a sexual relationship, talk with your partner about possible infection risks and take action to protect yourself from exposure to a sexual infection.    Please request an infection screen for STIs (sexually transmitted infections) if you are less than age 26 OR believe that you may be at risk.     Get a flu shot each year. Get a tetanus shot every 10 years. EVERYONE needs a pertussis (Whooping cough) booster.    See your dentist twice a year for an exam and preventive care cleaning.     Consider the following screen tests:    1) cholesterol test every 5 years.     2) yearly mammogram after age 40 unless you have identified risks.    3) colonoscopy every 10 years after age 50 unless you have identified risks.    4) diabetes blood test screening if you are at risk for diabetes.      Additional information that you may also find helpful:  The Internet now gives us access to LOTS of information -- some of it helpful, research documented and also plenty of harmful, anecdotal information that may not pertain to your situtaion. Consider visiting the following websites for accurate health information:    www.vitamindcouncil.org/ : Info and ongoing research re Vitamin D    www.fairview.org : Up to date and easily searchable information on multiple topics.    www.medlineplus.gov : medication info, interactive tutorials, watch real surgeries online    www.cdc.gov : public health info, travel advisories, epidemics (H1N1)    www.natalia/std.org: current research re diagnosis, treatment and prevention of sexually contacted infections.    www.health.[facility].[place] : [place] dept of heatl, public health issues in [place], N1N1    www.familydoctor.org : good info from the Academy of Family Physicians

## 2018-12-26 NOTE — LETTER
"2018       RE: Stephy Daniel  [address] [facility] [address]     Dear Colleague,    Thank you for referring your patient, Stephy Daniel, to the [facility] at [facility]. Please see a copy of my visit note below.    Progress Note    SUBJECTIVE:  Stephy Daniel is an 33 year old, , who requests an Annual Preventive Exam.     Concerns today include:   1. Rash on right middle finger: First noted in the summer; became larger in the first couple weeks, but now has been stable in size; described as pink and \"thickened\" skin. Denies itching or other symptoms. No known exposures to new products or bug bites.  Never had anything like this in the past    Contraception: NFP, uses an pavan on her phone to predict ovulation; not planning a pregnancy in the next 6 months but potentially soon after that; getting  in May 2019.  No longer taking the Progesterone only pills, as she did not like the frequent spotting. Would be ok with a pregnancy if it were to occur    Last pap smear: 2017 NIL, HPV negative  --> due for co-testing in 3 years (2020)  History of abnormal Pap smear: yes  - 10/2012 LSIL  - 2013 Colposcopy --> JOSÉ 1  - 2014 NIL  - 2016 NIL, Other HR HPV positive     Sexual Hx:  - Positive chlamydia test 2017; her and her partner were treated; open to retesting today  - no other hx of STDs  - no new partners  - Denies sexual concerns     Preventative Health Labs:  - Glucose, TSH, and Lipids WNL on 2017    Mammogram: n/a - no family hx of breast cancer    Colonoscopy: n/a - no family hx of colon cancer    Exercise: has been running/ jogging a couple days per week; does yoga on occasion    Diet: vegetarian, eats dairy and leafy green vegetables frequently as source of calcium     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; 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, but feels she can manage this well; lives with her " zakia; wedding planning has not been overly stressful    Menstrual History:  Menstrual History 2017   LAST MENSTRUAL PERIOD 2017 - 2018   Period Cycle (Days) - 28 -   Period Duration (Days) - spotting in between -   Method of Contraception - Combined oral contraceptive -   Period Pattern - Irregular -   Menstrual Flow - Heavy;Light -   Menstrual Control - Tampon;Other (Comment) -   Dysmenorrhea - Mild -   PMS Symptoms - Cramping -   Reviewed Today - Yes -     Since stopping her progesterone only pills, she has had regular menses, about once per month, without intermenstrual bleeding. Menses are moderate heaviness with dysmenorrhea which is tolerable. Bleeding lasts 5 days.     Mental Health:   PHQ-9 SCORE 2018   PHQ-9 Total Score 0     CARLEE-7 SCORE 2017   Total Score 2 (minimal anxiety) 0 (minimal anxiety) -   Total Score 2 0 0       HISTORY:    Current Outpatient Medications on File Prior to Visit:  Ascorbic Acid (VITAMIN C PO) Take 1 tablet by mouth daily   VITAMIN D, CHOLECALCIFEROL, PO Take by mouth daily   Magnesium Carbonate POWD Take 1 teaspoonful by mouth daily   ZINC CHELATED PO Take 1 tablet by mouth daily     No current facility-administered medications on file prior to visit.   Allergies   Allergen Reactions     Amoxicillin Rash       There is no immunization history on file for this patient.  Declines flu vaccine today  Obstetric History       T0      L0     SAB0   TAB0   Ectopic0   Multiple0   Live Births0      Past Medical History:   Diagnosis Date     NO ACTIVE PROBLEMS      Past Surgical History:   Procedure Laterality Date     ORTHOPEDIC SURGERY  2017    clavicle fracture, internal fixation with metal plate     TONSILLECTOMY       Family History   Problem Relation Age of Onset     Other Cancer Maternal Grandmother         Melanoma - skin cancer runs in the maternal side of my family     Breast Cancer No family  "hx of      Colon Cancer No family hx of      Diabetes No family hx of      Thyroid Disease No family hx of      Social History     Socioeconomic History     Marital status: Single     Spouse name: None     Number of children: None     Years of education: None     Highest education level: None   Social Needs     Financial resource strain: None     Food insecurity - worry: None     Food insecurity - inability: None     Transportation needs - medical: None     Transportation needs - non-medical: None   Occupational History     None   Tobacco Use     Smoking status: Never Smoker     Smokeless tobacco: Never Used   Substance and Sexual Activity     Alcohol use: Yes     Alcohol/week: 0.6 - 1.2 oz     Comment: social, occasional 1-2 drinks per week.     Drug use: No     Sexual activity: Yes     Partners: Male     Birth control/protection: Natural Family Planning   Other Topics Concern     None   Social History Narrative    How much exercise per week? 3 days    How much calcium per day? In foods        How much caffeine per day? none    How much vitamin D per day? supplement    Do you/your family wear seatbelts?  Yes    Do you/your family use safety helmets? Yes    Do you/your family use sunscreen? Yes    Do you/your family keep firearms in the home? No    Do you/your family have a smoke detector(s)? Yes        December 21, 2017 Pam Guzman CMA on 12/26/2018 at 8:14 AM         EXAM:  Blood pressure 111/76, pulse 66, height 1.651 m (5' 5\"), weight 92.7 kg (204 lb 4.8 oz), last menstrual period 11/22/2018, not currently breastfeeding. Body mass index is 34 kg/m .  General - pleasant female in no acute distress.  Skin - erythematous rash on right antecubital fossa; 2cm by 2cm raised erythematous rash on middle finger between MCP and PIP joints of right hand with discrete, well defined edges  EENT-  euthyroid with out palpable nodules  Neck - supple without lymphadenopathy.  Lungs - clear to " auscultation bilaterally.  Heart - regular rate and rhythm without murmur.  Abdomen - soft, nontender, nondistended, no masses or organomegaly noted.  Musculoskeletal - no gross deformities.  Neurological - normal strength, sensation, and mental status.    Breast Exam:  Breast: Without visible skin changes. No dimpling or lesions seen.   Breasts supple, non-tender with palpation, no dominant mass, nodularity, or nipple discharge noted bilaterally. Axillary nodes negative.      Pelvic Exam: deferred, not due for pap smear; denies sexual or pelvic concerns    ASSESSMENT:  Encounter Diagnoses   Name Primary?     Visit for preventive health examination Yes     Screening examination for venereal disease      Rash      Hx of chlamydia infection         PLAN:   Pap smear next due 2020  Gonorrhea & chlamydia tests completed today  Referred patient to Dr. Dorman, dermatology for evaluation and management of rash on right hand and arm; encouraged her to apply scant amount of hydrocortisone 1% cream daily to rashes until seen.    Encouraged Stephy to start taking a prenatal vitamin daily as she is not reliably preventing a pregnancy and plans to start trying to conceive in 6+ months.     Additional teaching done at this visit regarding calcium (1200 mg per day), self breast exam, exercise, birth control, preconception, mental health and weight/diet.    Return to clinic in one year.  Follow-up as needed.    Kaila Roy, DNP, APRN, WHNP

## 2018-12-26 NOTE — NURSING NOTE
Chief Complaint   Patient presents with     Annual Visit     Derm Problem     right hand middle finger a rash that showed up a few months ago would like to discuss      Health Maintenance Due   Topic Date Due     DTAP/TDAP/TD IMMUNIZATION (1 - Tdap) 04/10/2010     INFLUENZA VACCINE (1) 09/01/2018   patient declines flu vaccine  Li Guzman CMA on 12/26/2018 at 8:12 AM

## 2018-12-26 NOTE — PROGRESS NOTES
"  Progress Note    SUBJECTIVE:  Stephy Daniel is an 33 year old, , who requests an Annual Preventive Exam.     Concerns today include:   1. Rash on right middle finger: First noted in the summer; became larger in the first couple weeks, but now has been stable in size; described as pink and \"thickened\" skin. Denies itching or other symptoms. No known exposures to new products or bug bites.  Never had anything like this in the past    Contraception: NFP, uses an pavan on her phone to predict ovulation; not planning a pregnancy in the next 6 months but potentially soon after that; getting  in May 2019.  No longer taking the Progesterone only pills, as she did not like the frequent spotting. Would be ok with a pregnancy if it were to occur    Last pap smear: 2017 NIL, HPV negative  --> due for co-testing in 3 years (2020)  History of abnormal Pap smear: yes  - 10/2012 LSIL  - 2013 Colposcopy --> JOSÉ 1  - 2014 NIL  - 2016 NIL, Other HR HPV positive     Sexual Hx:  - Positive chlamydia test 2017; her and her partner were treated; open to retesting today  - no other hx of STDs  - no new partners  - Denies sexual concerns     Preventative Health Labs:  - Glucose, TSH, and Lipids WNL on 2017    Mammogram: n/a - no family hx of breast cancer    Colonoscopy: n/a - no family hx of colon cancer    Exercise: has been running/ jogging a couple days per week; does yoga on occasion    Diet: vegetarian, eats dairy and leafy green vegetables frequently as source of calcium     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; 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, but feels she can manage this well; lives with her fiance; wedding planning has not been overly stressful    Menstrual History:  Menstrual History 2017   LAST MENSTRUAL PERIOD 2017 - 2018   Period Cycle (Days) - 28 -   Period Duration (Days) - spotting in between -   Method of Contraception - " Combined oral contraceptive -   Period Pattern - Irregular -   Menstrual Flow - Heavy;Light -   Menstrual Control - Tampon;Other (Comment) -   Dysmenorrhea - Mild -   PMS Symptoms - Cramping -   Reviewed Today - Yes -     Since stopping her progesterone only pills, she has had regular menses, about once per month, without intermenstrual bleeding. Menses are moderate heaviness with dysmenorrhea which is tolerable. Bleeding lasts 5 days.     Mental Health:   PHQ-9 SCORE 2018   PHQ-9 Total Score 0     CARLEE-7 SCORE 2017   Total Score 2 (minimal anxiety) 0 (minimal anxiety) -   Total Score 2 0 0       HISTORY:    Current Outpatient Medications on File Prior to Visit:  Ascorbic Acid (VITAMIN C PO) Take 1 tablet by mouth daily   VITAMIN D, CHOLECALCIFEROL, PO Take by mouth daily   Magnesium Carbonate POWD Take 1 teaspoonful by mouth daily   ZINC CHELATED PO Take 1 tablet by mouth daily     No current facility-administered medications on file prior to visit.   Allergies   Allergen Reactions     Amoxicillin Rash       There is no immunization history on file for this patient.  Declines flu vaccine today  Obstetric History       T0      L0     SAB0   TAB0   Ectopic0   Multiple0   Live Births0      Past Medical History:   Diagnosis Date     NO ACTIVE PROBLEMS      Past Surgical History:   Procedure Laterality Date     ORTHOPEDIC SURGERY  2017    clavicle fracture, internal fixation with metal plate     TONSILLECTOMY       Family History   Problem Relation Age of Onset     Other Cancer Maternal Grandmother         Melanoma - skin cancer runs in the maternal side of my family     Breast Cancer No family hx of      Colon Cancer No family hx of      Diabetes No family hx of      Thyroid Disease No family hx of      Social History     Socioeconomic History     Marital status: Single     Spouse name: None     Number of children: None     Years of education: None     Highest education  level: None   Social Needs     Financial resource strain: None     Food insecurity - worry: None     Food insecurity - inability: None     Transportation needs - medical: None     Transportation needs - non-medical: None   Occupational History     None   Tobacco Use     Smoking status: Never Smoker     Smokeless tobacco: Never Used   Substance and Sexual Activity     Alcohol use: Yes     Alcohol/week: 0.6 - 1.2 oz     Comment: social, occasional 1-2 drinks per week.     Drug use: No     Sexual activity: Yes     Partners: Male     Birth control/protection: Natural Family Planning   Other Topics Concern     None   Social History Narrative    How much exercise per week? 3 days    How much calcium per day? In foods        How much caffeine per day? none    How much vitamin D per day? supplement    Do you/your family wear seatbelts?  Yes    Do you/your family use safety helmets? Yes    Do you/your family use sunscreen? Yes    Do you/your family keep firearms in the home? No    Do you/your family have a smoke detector(s)? Yes        December 21, 2017 Pam Guzman CMA on 12/26/2018 at 8:14 AM           ROS  Answers for HPI/ROS submitted by the patient on 12/12/2018   CARLEE 7 TOTAL SCORE: 0  General Symptoms: No  Skin Symptoms: Yes  HENT Symptoms: No  EYE SYMPTOMS: No  HEART SYMPTOMS: No  LUNG SYMPTOMS: No  INTESTINAL SYMPTOMS: No  URINARY SYMPTOMS: No  GYNECOLOGIC SYMPTOMS: No  BREAST SYMPTOMS: No  SKELETAL SYMPTOMS: No  BLOOD SYMPTOMS: No  NERVOUS SYSTEM SYMPTOMS: No  MENTAL HEALTH SYMPTOMS: No  Changes in hair: No  Changes in moles/birth marks: No  Itching: No  Rashes: Yes - see above  Changes in nails: No  Acne: No  Hair in places you don't want it: No  Change in facial hair: No  Warts: No  Non-healing sores: No  Scarring: No  Flaking of skin: No  Color changes of hands/feet in cold : No  Sun sensitivity: No  Skin thickening: Yes - see above      EXAM:  Blood pressure 111/76, pulse 66, height  "1.651 m (5' 5\"), weight 92.7 kg (204 lb 4.8 oz), last menstrual period 11/22/2018, not currently breastfeeding. Body mass index is 34 kg/m .  General - pleasant female in no acute distress.  Skin - erythematous rash on right antecubital fossa; 2cm by 2cm raised erythematous rash on middle finger between MCP and PIP joints of right hand with discrete, well defined edges  EENT-  euthyroid with out palpable nodules  Neck - supple without lymphadenopathy.  Lungs - clear to auscultation bilaterally.  Heart - regular rate and rhythm without murmur.  Abdomen - soft, nontender, nondistended, no masses or organomegaly noted.  Musculoskeletal - no gross deformities.  Neurological - normal strength, sensation, and mental status.    Breast Exam:  Breast: Without visible skin changes. No dimpling or lesions seen.   Breasts supple, non-tender with palpation, no dominant mass, nodularity, or nipple discharge noted bilaterally. Axillary nodes negative.      Pelvic Exam: deferred, not due for pap smear; denies sexual or pelvic concerns      ASSESSMENT:  Encounter Diagnoses   Name Primary?     Visit for preventive health examination Yes     Screening examination for venereal disease      Rash      Hx of chlamydia infection         PLAN:   Pap smear next due 2020  Gonorrhea & chlamydia tests completed today  Referred patient to Dr. Dorman, dermatology for evaluation and management of rash on right hand and arm; encouraged her to apply scant amount of hydrocortisone 1% cream daily to rashes until seen.    Encouraged Stephy to start taking a prenatal vitamin daily as she is not reliably preventing a pregnancy and plans to start trying to conceive in 6+ months.     Additional teaching done at this visit regarding calcium (1200 mg per day), self breast exam, exercise, birth control, preconception, mental health and weight/diet.    Return to clinic in one year.  Follow-up as needed.    Kaila Roy, DNP, APRN, WHNP            "

## 2018-12-28 LAB
C TRACH DNA SPEC QL NAA+PROBE: NEGATIVE
N GONORRHOEA DNA SPEC QL NAA+PROBE: NEGATIVE
SPECIMEN SOURCE: NORMAL
SPECIMEN SOURCE: NORMAL

## 2019-11-08 ENCOUNTER — HEALTH MAINTENANCE LETTER (OUTPATIENT)
Age: 34
End: 2019-11-08

## 2019-12-12 ASSESSMENT — ANXIETY QUESTIONNAIRES
6. BECOMING EASILY ANNOYED OR IRRITABLE: NOT AT ALL
3. WORRYING TOO MUCH ABOUT DIFFERENT THINGS: NOT AT ALL
1. FEELING NERVOUS, ANXIOUS, OR ON EDGE: NOT AT ALL
7. FEELING AFRAID AS IF SOMETHING AWFUL MIGHT HAPPEN: NOT AT ALL
7. FEELING AFRAID AS IF SOMETHING AWFUL MIGHT HAPPEN: NOT AT ALL
GAD7 TOTAL SCORE: 0
5. BEING SO RESTLESS THAT IT IS HARD TO SIT STILL: NOT AT ALL
2. NOT BEING ABLE TO STOP OR CONTROL WORRYING: NOT AT ALL
4. TROUBLE RELAXING: NOT AT ALL
GAD7 TOTAL SCORE: 0

## 2019-12-13 ASSESSMENT — ANXIETY QUESTIONNAIRES: GAD7 TOTAL SCORE: 0

## 2019-12-19 NOTE — PROGRESS NOTES
"  Progress Note    SUBJECTIVE:  Stephy Daniel is an 34 year old, , who requests an Annual Preventive Exam.     Concerns today include:     1. Upper respiratory symptoms: Cough and sore throat started 4 days ago; symptoms seem to be the same or worsening overtime.. Denies sinus pressure, body aches, headache, sneezing, and fever. Has had some chills at night.  Hx of tonsillectomy. Last had strep throat as a child. Using cough drops and herbal throat spray.  Woke up due to sore throat last night. No known exposures to someone who has been ill.  Has not gotten a flu vaccine this year.      2. Varicose vein in left leg: Noticeable, but not necessarily bothersome. Symptom of itchiness. Has been present for \"a long time.\"     Contraception: NFP, uses an pavan on her phone to predict ovulation; declines any medications to manage menses/ prevent pregnancy  - Planning to start trying to conceive early   - Has not started a Prenatal vitamin yet    Last pap smear: 2017 NIL, HPV negative  --> due for co-testing in 3 years (2020) --- however, pt requests one today  History of abnormal Pap smear: yes  - 10/2012 LSIL  - 2013 Colposcopy --> JOSÉ 1  - 2014 NIL  - 2016 NIL, Other HR HPV positive      Sexual Hx:  - Positive chlamydia test 2017; her and her partner were treated; neg gonorrhea & chlamydia tests 2018  - no other hx of STDs  - no new partners; pt declines STD testing today  - Denies sexual concerns     Preventative Health Labs:  - Glucose, TSH, and Lipids WNL on 2017     Mammogram: n/a - no family hx of breast cancer     Colonoscopy: n/a - no family hx of colon cancer     BMI: 31  - Has lost 20lbs since September  - Doing intermittent fasting and increased exercise; goes to the gym almost daily and choosing healthier foods. Increased weight training and yoga classes as well as cardio 1-2 times per week. 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  No current facility-administered medications on file prior to visit.     Allergies   Allergen Reactions     Amoxicillin Rash       There is no immunization history on file for this patient.    OB History    Para Term  AB Living   0 0 0 0 0 0   SAB TAB Ectopic Multiple Live Births   0 0 0 0 0     Past Medical History:   Diagnosis Date     NO ACTIVE PROBLEMS      Past Surgical History:   Procedure Laterality Date     ORTHOPEDIC SURGERY  2017    clavicle fracture, internal fixation with metal plate     TONSILLECTOMY       Family History   Problem Relation Age of Onset     Other Cancer Maternal Grandmother         Melanoma - skin cancer runs in the maternal side of my family     Breast Cancer No family hx of      Colon Cancer No family hx of      Diabetes No family hx of      Thyroid Disease No family hx of      Social History     Socioeconomic History     Marital status:      Spouse name: None     Number of children: None     Years of education: None     Highest education level: None   Occupational History     None   Social Needs     Financial resource strain: None     Food insecurity:     Worry: None     Inability: None     Transportation needs:     Medical: None     Non-medical: None   Tobacco Use     Smoking status: Never Smoker     Smokeless tobacco: Never Used    Substance and Sexual Activity     Alcohol use: Yes     Alcohol/week: 1.0 - 2.0 standard drinks     Frequency: 2-3 times a week     Drinks per session: 1 or 2     Binge frequency: Never     Comment: social, occasional 1-2 drinks per week.     Drug use: No     Sexual activity: Yes     Partners: Male     Birth control/protection: Natural Family Planning   Lifestyle     Physical activity:     Days per week: None     Minutes per session: None     Stress: None   Relationships     Social connections:     Talks on phone: None     Gets together: None     Attends Baptism service: None     Active member of club or organization: None     Attends meetings of clubs or organizations: None     Relationship status: None     Intimate partner violence:     Fear of current or ex partner: None     Emotionally abused: None     Physically abused: None     Forced sexual activity: None   Other Topics Concern     None   Social History Narrative    How much exercise per week? 3 days    How much calcium per day? In foods        How much caffeine per day?  Occasional tea    How much vitamin D per day? supplement    Do you/your family wear seatbelts?  Yes    Do you/your family use safety helmets? Yes    Do you/your family use sunscreen? Yes    Do you/your family keep firearms in the home? No    Do you/your family have a smoke detector(s)? Yes        December 21, 2017 Pam Guzman CMA on 12/26/2018 at 8:14 AM        Reviewed cmckim lpn 12-       ROS    PHQ-9 SCORE 12/26/2018   PHQ-9 Total Score 0     CARLEE-7 SCORE 12/12/2018 12/26/2018 12/12/2019   Total Score 0 (minimal anxiety) - 0 (minimal anxiety)   Total Score 0 0 0     Answers for HPI/ROS submitted by the patient on 12/12/2019   CARLEE 7 TOTAL SCORE: 0  General Symptoms: No  Skin Symptoms: No  HENT Symptoms: No  EYE SYMPTOMS: No  HEART SYMPTOMS: No  LUNG SYMPTOMS: No  INTESTINAL SYMPTOMS: No  URINARY SYMPTOMS: No  GYNECOLOGIC SYMPTOMS: No  BREAST SYMPTOMS:  "No  SKELETAL SYMPTOMS: No  BLOOD SYMPTOMS: No  NERVOUS SYSTEM SYMPTOMS: No  MENTAL HEALTH SYMPTOMS: No      EXAM:  Blood pressure 124/79, pulse 78, height 1.651 m (5' 5\"), weight 83.7 kg (184 lb 9.6 oz), last menstrual period 12/12/2019, not currently breastfeeding. Body mass index is 30.72 kg/m .  General - pleasant female in no acute distress.  Skin - no suspicious lesions or rashes; varicose vein present on left outer LE  EENT-  PERRLA, tonsils surgically absent; euthyroid with out palpable nodules; no erythema or drainage noted in throat; bilateral enlargement of submandibular lymph nodes, non-tender  Neck - supple without lymphadenopathy.  Lungs - clear to auscultation bilaterally.  Heart - regular rate and rhythm without murmur.  Abdomen - soft, nontender, nondistended, no masses or organomegaly noted.  Musculoskeletal - no gross deformities.  Neurological - normal strength, sensation, and mental status.    Breast Exam:  Breast: Without visible skin changes. No dimpling or lesions seen.   Breasts supple, non-tender with palpation, no dominant mass, nodularity, or nipple discharge noted bilaterally. Axillary nodes negative.      Pelvic Exam:  EG/BUS: Normal genital architecture without lesions, erythema or abnormal secretions; Bartholin's, Urethra, Dorris's normal   Urethral meatus: normal   Urethra: no masses, tenderness, or scarring   Bladder: no masses or tenderness   Vagina: moist, pink, rugae with creamy, white and odorless secretions  Cervix: Nulliparous, and pink, moist, closed, without lesion or CMT  Uterus: anteverted,  and small, smooth, firm, mobile w/o pain  Adnexa: Within normal limits and No masses, nodularity, tenderness  Rectum: anus normal     ASSESSMENT:  Encounter Diagnoses   Name Primary?     Cough      Visit for preventive health examination Yes     Screening for malignant neoplasm of cervix      Encounter for gynecological examination without abnormal finding      Encounter for " preconception consultation      Varicose veins without complication         PLAN:   Orders Placed This Encounter   Procedures     Pelvic and Breast Exam Procedure []     Pap Smear Exam [] Do Not Remove     Pap imaged thin layer screen with HPV - recommended age 30 - 65 years (select HPV order below)     HPV High Risk Types DNA Cervical     Preventative Health:   - Counseled patient on ASSCP Guidelines; her next pap smear is not due until 12/2020; however, it was done today per patient request.    - Preventative health labs are up to date  - Flu vaccine recommended when pt is feeling better    Cough & Sore Throat  - Influenza and RSV PCR done today given presentation of symptoms and current rates of influenza in the area. Patient will be called with a positive result and sent a emaze message if negative.   - Recommended testing for Strep throat; patient declined to have this done at this appointment and said she would go to her work clinic where they do it for free  - Recommended rest and hydration; may continue to use throat lozenges if soothing    Preconception Consultation:  - Recommended initiation of prenatal vitamin with >/= 400mcg folic acid; patient is a vegetarian so recommended increasing [place] 3 intake by Flaxseed or other non-fish products  - Counseled on normal length of time to conceive; may continue to track menses with pavan and time intercourse during fertile window  - Avoid alcohol when trying to conceive/ have conceived     Varicose Vein without complication, Left LE:  - Offered referral to vascular specialty for evaluation and treatment, however, given that pt desires pregnancy in the near future, counseled that these may worsen in pregnancy and she may consider waiting until after pregnancy to have them repaired.  - Recommended using compression stockings on lower extremities; patient will consider   - Reccommended application of Eucerin cream or other hydrating cream on the area as  skin appeared very dry, which may be leading to itchiness.     Additional teaching done at this visit regarding calcium (1200 mg per day), self breast exam, mental health and weight/diet.    Return to clinic in one year.  Follow-up as needed.    Kaila Roy, DNP, APRN, WHNP

## 2019-12-20 ENCOUNTER — OFFICE VISIT (OUTPATIENT)
Dept: OBGYN | Facility: CLINIC | Age: 34
End: 2019-12-20
Attending: NURSE PRACTITIONER
Payer: COMMERCIAL

## 2019-12-20 VITALS
BODY MASS INDEX: 30.75 KG/M2 | HEIGHT: 65 IN | DIASTOLIC BLOOD PRESSURE: 79 MMHG | HEART RATE: 78 BPM | SYSTOLIC BLOOD PRESSURE: 124 MMHG | WEIGHT: 184.6 LBS

## 2019-12-20 DIAGNOSIS — Z31.69 ENCOUNTER FOR PRECONCEPTION CONSULTATION: ICD-10-CM

## 2019-12-20 DIAGNOSIS — Z00.00 VISIT FOR PREVENTIVE HEALTH EXAMINATION: Primary | ICD-10-CM

## 2019-12-20 DIAGNOSIS — I83.90 VARICOSE VEINS WITHOUT COMPLICATION: ICD-10-CM

## 2019-12-20 DIAGNOSIS — Z01.419 ENCOUNTER FOR GYNECOLOGICAL EXAMINATION WITHOUT ABNORMAL FINDING: ICD-10-CM

## 2019-12-20 DIAGNOSIS — Z12.4 SCREENING FOR MALIGNANT NEOPLASM OF CERVIX: ICD-10-CM

## 2019-12-20 DIAGNOSIS — R05.9 COUGH: ICD-10-CM

## 2019-12-20 LAB
FLUAV+FLUBV RNA SPEC QL NAA+PROBE: NEGATIVE
FLUAV+FLUBV RNA SPEC QL NAA+PROBE: POSITIVE
RSV RNA SPEC NAA+PROBE: NEGATIVE
SPECIMEN SOURCE: ABNORMAL

## 2019-12-20 PROCEDURE — 87631 RESP VIRUS 3-5 TARGETS: CPT | Performed by: NURSE PRACTITIONER

## 2019-12-20 PROCEDURE — 87624 HPV HI-RISK TYP POOLED RSLT: CPT | Performed by: NURSE PRACTITIONER

## 2019-12-20 PROCEDURE — G0145 SCR C/V CYTO,THINLAYER,RESCR: HCPCS | Performed by: NURSE PRACTITIONER

## 2019-12-20 SDOH — HEALTH STABILITY: MENTAL HEALTH: HOW OFTEN DO YOU HAVE A DRINK CONTAINING ALCOHOL?: 2-3 TIMES A WEEK

## 2019-12-20 SDOH — HEALTH STABILITY: MENTAL HEALTH: HOW OFTEN DO YOU HAVE 6 OR MORE DRINKS ON ONE OCCASION?: NEVER

## 2019-12-20 SDOH — HEALTH STABILITY: MENTAL HEALTH: HOW MANY STANDARD DRINKS CONTAINING ALCOHOL DO YOU HAVE ON A TYPICAL DAY?: 1 OR 2

## 2019-12-20 ASSESSMENT — PAIN SCALES - GENERAL: PAINLEVEL: NO PAIN (0)

## 2019-12-20 ASSESSMENT — MIFFLIN-ST. JEOR: SCORE: 1538.22

## 2019-12-20 NOTE — LETTER
"2019       RE: Stephy Daniel  [address]  [facility] [address]     Dear Colleague,    Thank you for referring your patient, Stephy Daniel, to the [facility] at [facility]. Please see a copy of my visit note below.      Progress Note    SUBJECTIVE:  Stephy Daniel is an 34 year old, , who requests an Annual Preventive Exam.     Concerns today include:     1. Upper respiratory symptoms: Cough and sore throat started 4 days ago; symptoms seem to be the same or worsening overtime.. Denies sinus pressure, body aches, headache, sneezing, and fever. Has had some chills at night.  Hx of tonsillectomy. Last had strep throat as a child. Using cough drops and herbal throat spray.  Woke up due to sore throat last night. No known exposures to someone who has been ill.  Has not gotten a flu vaccine this year.      2. Varicose vein in left leg: Noticeable, but not necessarily bothersome. Symptom of itchiness. Has been present for \"a long time.\"     Contraception: NFP, uses an pavan on her phone to predict ovulation; declines any medications to manage menses/ prevent pregnancy  - Planning to start trying to conceive early   - Has not started a Prenatal vitamin yet    Last pap smear: 2017 NIL, HPV negative  --> due for co-testing in 3 years (2020) --- however, pt requests one today  History of abnormal Pap smear: yes  - 10/2012 LSIL  - 2013 Colposcopy --> JOSÉ 1  - 2014 NIL  - 2016 NIL, Other HR HPV positive      Sexual Hx:  - Positive chlamydia test 2017; her and her partner were treated; neg gonorrhea & chlamydia tests 2018  - no other hx of STDs  - no new partners; pt declines STD testing today  - Denies sexual concerns     Preventative Health Labs:  - Glucose, TSH, and Lipids WNL on 2017     Mammogram: n/a - no family hx of breast cancer     Colonoscopy: n/a - no family hx of colon cancer     BMI: 31  - Has lost 20lbs " since September  - Doing intermittent fasting and increased exercise; goes to the gym almost daily and choosing healthier foods. Increased weight training and yoga classes as well as cardio 1-2 times per week. 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  No current facility-administered medications on file prior to visit.     Allergies   Allergen Reactions     Amoxicillin Rash       There is no immunization history on file for this patient.    OB History    Para Term  AB Living   0 0 0 0 0 0   SAB TAB Ectopic Multiple Live Births   0 0 0 0 0     Past Medical History:   Diagnosis Date     NO ACTIVE PROBLEMS      Past Surgical History:   Procedure Laterality Date     ORTHOPEDIC SURGERY  2017    clavicle fracture, internal fixation with metal plate     TONSILLECTOMY       Family History   Problem Relation Age of Onset     Other Cancer Maternal Grandmother         Melanoma - skin cancer runs in the maternal side of my family     Breast Cancer No family hx of      Colon Cancer No family hx of      Diabetes No family hx of      Thyroid Disease No family hx of      Social History     Socioeconomic History     Marital status:      Spouse name: None     Number of children: None     Years of education: None     Highest education level: None    Occupational History     None   Social Needs     Financial resource strain: None     Food insecurity:     Worry: None     Inability: None     Transportation needs:     Medical: None     Non-medical: None   Tobacco Use     Smoking status: Never Smoker     Smokeless tobacco: Never Used   Substance and Sexual Activity     Alcohol use: Yes     Alcohol/week: 1.0 - 2.0 standard drinks     Frequency: 2-3 times a week     Drinks per session: 1 or 2     Binge frequency: Never     Comment: social, occasional 1-2 drinks per week.     Drug use: No     Sexual activity: Yes     Partners: Male     Birth control/protection: Natural Family Planning   Lifestyle     Physical activity:     Days per week: None     Minutes per session: None     Stress: None   Relationships     Social connections:     Talks on phone: None     Gets together: None     Attends Sabianism service: None     Active member of club or organization: None     Attends meetings of clubs or organizations: None     Relationship status: None     Intimate partner violence:     Fear of current or ex partner: None     Emotionally abused: None     Physically abused: None     Forced sexual activity: None   Other Topics Concern     None   Social History Narrative    How much exercise per week? 3 days    How much calcium per day? In foods        How much caffeine per day?  Occasional tea    How much vitamin D per day? supplement    Do you/your family wear seatbelts?  Yes    Do you/your family use safety helmets? Yes    Do you/your family use sunscreen? Yes    Do you/your family keep firearms in the home? No    Do you/your family have a smoke detector(s)? Yes        December 21, 2017 Pam Guzman CMA on 12/26/2018 at 8:14 AM        Reviewed cmckim lpn 12-       ROS    PHQ-9 SCORE 12/26/2018   PHQ-9 Total Score 0     CARLEE-7 SCORE 12/12/2018 12/26/2018 12/12/2019   Total Score 0 (minimal anxiety) - 0 (minimal anxiety)   Total Score 0 0 0  "    Answers for HPI/ROS submitted by the patient on 12/12/2019   CARLEE 7 TOTAL SCORE: 0  General Symptoms: No  Skin Symptoms: No  HENT Symptoms: No  EYE SYMPTOMS: No  HEART SYMPTOMS: No  LUNG SYMPTOMS: No  INTESTINAL SYMPTOMS: No  URINARY SYMPTOMS: No  GYNECOLOGIC SYMPTOMS: No  BREAST SYMPTOMS: No  SKELETAL SYMPTOMS: No  BLOOD SYMPTOMS: No  NERVOUS SYSTEM SYMPTOMS: No  MENTAL HEALTH SYMPTOMS: No      EXAM:  Blood pressure 124/79, pulse 78, height 1.651 m (5' 5\"), weight 83.7 kg (184 lb 9.6 oz), last menstrual period 12/12/2019, not currently breastfeeding. Body mass index is 30.72 kg/m .  General - pleasant female in no acute distress.  Skin - no suspicious lesions or rashes; varicose vein present on left outer LE  EENT-  PERRLA, tonsils surgically absent; euthyroid with out palpable nodules; no erythema or drainage noted in throat; bilateral enlargement of submandibular lymph nodes, non-tender  Neck - supple without lymphadenopathy.  Lungs - clear to auscultation bilaterally.  Heart - regular rate and rhythm without murmur.  Abdomen - soft, nontender, nondistended, no masses or organomegaly noted.  Musculoskeletal - no gross deformities.  Neurological - normal strength, sensation, and mental status.    Breast Exam:  Breast: Without visible skin changes. No dimpling or lesions seen.   Breasts supple, non-tender with palpation, no dominant mass, nodularity, or nipple discharge noted bilaterally. Axillary nodes negative.      Pelvic Exam:  EG/BUS: Normal genital architecture without lesions, erythema or abnormal secretions; Bartholin's, Urethra, Stroud's normal   Urethral meatus: normal   Urethra: no masses, tenderness, or scarring   Bladder: no masses or tenderness   Vagina: moist, pink, rugae with creamy, white and odorless secretions  Cervix: Nulliparous, and pink, moist, closed, without lesion or CMT  Uterus: anteverted,  and small, smooth, firm, mobile w/o pain  Adnexa: Within normal limits and No masses, " nodularity, tenderness  Rectum: anus normal     ASSESSMENT:  Encounter Diagnoses   Name Primary?     Cough      Visit for preventive health examination Yes     Screening for malignant neoplasm of cervix      Encounter for gynecological examination without abnormal finding      Encounter for preconception consultation      Varicose veins without complication         PLAN:   Orders Placed This Encounter   Procedures     Pelvic and Breast Exam Procedure []     Pap Smear Exam [] Do Not Remove     Pap imaged thin layer screen with HPV - recommended age 30 - 65 years (select HPV order below)     HPV High Risk Types DNA Cervical     Preventative Health:   - Counseled patient on ASSCP Guidelines; her next pap smear is not due until 12/2020; however, it was done today per patient request.    - Preventative health labs are up to date  - Flu vaccine recommended when pt is feeling better    Cough & Sore Throat  - Influenza and RSV PCR done today given presentation of symptoms and current rates of influenza in the area. Patient will be called with a positive result and sent a Aldis message if negative.   - Recommended testing for Strep throat; patient declined to have this done at this appointment and said she would go to her work clinic where they do it for free  - Recommended rest and hydration; may continue to use throat lozenges if soothing    Preconception Consultation:  - Recommended initiation of prenatal vitamin with >/= 400mcg folic acid; patient is a vegetarian so recommended increasing [place] 3 intake by Flaxseed or other non-fish products  - Counseled on normal length of time to conceive; may continue to track menses with pavan and time intercourse during fertile window  - Avoid alcohol when trying to conceive/ have conceived     Varicose Vein without complication, Left LE:  - Offered referral to vascular specialty for evaluation and treatment, however, given that pt desires pregnancy in the near future,  counseled that these may worsen in pregnancy and she may consider waiting until after pregnancy to have them repaired.  - Recommended using compression stockings on lower extremities; patient will consider   - Reccommended application of Eucerin cream or other hydrating cream on the area as skin appeared very dry, which may be leading to itchiness.     Additional teaching done at this visit regarding calcium (1200 mg per day), self breast exam, mental health and weight/diet.    Return to clinic in one year.  Follow-up as needed.    Kaila Roy, DNP, APRN, WHNP

## 2019-12-20 NOTE — PATIENT INSTRUCTIONS

## 2019-12-26 LAB
COPATH REPORT: NORMAL
PAP: NORMAL

## 2019-12-30 LAB
FINAL DIAGNOSIS: NORMAL
HPV HR 12 DNA CVX QL NAA+PROBE: NEGATIVE
HPV16 DNA SPEC QL NAA+PROBE: NEGATIVE
HPV18 DNA SPEC QL NAA+PROBE: NEGATIVE
SPECIMEN DESCRIPTION: NORMAL
SPECIMEN SOURCE CVX/VAG CYTO: NORMAL

## 2020-12-06 ENCOUNTER — HEALTH MAINTENANCE LETTER (OUTPATIENT)
Age: 35
End: 2020-12-06

## 2021-02-05 ENCOUNTER — HOME CARE/HOSPICE - HEALTHEAST (OUTPATIENT)
Dept: HOME HEALTH SERVICES | Facility: HOME HEALTH | Age: 36
End: 2021-02-05

## 2021-02-05 ENCOUNTER — COMMUNICATION - HEALTHEAST (OUTPATIENT)
Dept: SCHEDULING | Facility: CLINIC | Age: 36
End: 2021-02-05

## 2021-02-07 ENCOUNTER — HOME CARE/HOSPICE - HEALTHEAST (OUTPATIENT)
Dept: HOME HEALTH SERVICES | Facility: HOME HEALTH | Age: 36
End: 2021-02-07

## 2021-02-16 ENCOUNTER — COMMUNICATION - HEALTHEAST (OUTPATIENT)
Dept: SCHEDULING | Facility: CLINIC | Age: 36
End: 2021-02-16

## 2021-02-20 ENCOUNTER — HEALTH MAINTENANCE LETTER (OUTPATIENT)
Age: 36
End: 2021-02-20

## 2021-05-27 VITALS
RESPIRATION RATE: 18 BRPM | DIASTOLIC BLOOD PRESSURE: 62 MMHG | SYSTOLIC BLOOD PRESSURE: 102 MMHG | HEART RATE: 70 BPM | TEMPERATURE: 98.2 F

## 2021-06-15 NOTE — TELEPHONE ENCOUNTER
Pt called in states she is postpartum day 10.  The Pt states she has mild vaginal bleeding.  The Pt states she passed large blood clots.  It was 10 min ago.  No abdominal pain.  The blood clots was only one time and it is larger than golf ball.  The Pt didn't take blood thinner medication.  No fever, no chills or dizziness.  deliver date was 2/6/2021.  Pt is breast feeding.  It is vaginal birth.  The disposition is home care.  Care advice given per protocol.  Patient agrees with care advice given to call back if the symptom is persisted.  Agreed to call back if he has additional symptoms or questions.    Hernesto Best RN, [facility] Triage/Med Refill 2/16/2021 7:13 PM      Reason for Disposition    Normal postpartum bleeding    Additional Information    Negative: Shock suspected (e.g., cold/pale/clammy skin, too weak to stand, low BP, rapid pulse)    Negative: Difficult to awaken or acting confused (e.g., disoriented, slurred speech)    Negative: Passed out (i.e., fainted, collapsed and was not responding)    Negative: Sounds like a life-threatening emergency to the triager    Negative: SEVERE dizziness (e.g., unable to stand, requires support to walk, feels like passing out now)    Negative: SEVERE abdominal pain and present > 1 hour    Negative: Fever > 100.4 F (38.0 C)    Negative: SEVERE vaginal bleeding (i.e., soaking 2 pads hour AND present 2 or more hours)    Negative: Patient sounds very sick or weak to the triager    Negative: MODERATE vaginal bleeding (i.e., soaking 1 pad per hour AND present > 6 hours)    Negative: Constant abdominal pain and present > 2 hours    Negative: Bleeding with > 6 soaked pads per day    Negative: Passing blood clots and persists > 4 days postpartum    Negative: Pale skin (pallor) of new onset or worsening    Negative: Foul smelling vaginal discharge (i.e., lochia)    Negative: Taking Coumadin (warfarin) or other strong blood thinner, or known bleeding disorder (e.g.,  thrombocytopenia)    Negative: Skin bruises or nosebleed and not caused by an injury    Negative: Patient wants to be seen    Negative: Vaginal bleeding or spotting lasts > 4 weeks AND red or red-brown    Negative: Vaginal bleeding or spotting lasts > 5 weeks AND pale-brown or pink    Protocols used: POSTPARTUM - VAGINAL BLEEDING AND LOCHIA-A-OH

## 2021-08-10 ENCOUNTER — MEDICAL CORRESPONDENCE (OUTPATIENT)
Dept: HEALTH INFORMATION MANAGEMENT | Facility: CLINIC | Age: 36
End: 2021-08-10

## 2021-09-25 ENCOUNTER — HEALTH MAINTENANCE LETTER (OUTPATIENT)
Age: 36
End: 2021-09-25

## 2022-03-12 ENCOUNTER — HEALTH MAINTENANCE LETTER (OUTPATIENT)
Age: 37
End: 2022-03-12

## 2023-01-09 LAB
ABO (EXTERNAL): NORMAL
HEPATITIS B SURFACE ANTIGEN (EXTERNAL): NONREACTIVE
HEPATITIS C ANTIBODY (EXTERNAL): NONREACTIVE
HIV1+2 AB SERPL QL IA: NONREACTIVE
RH (EXTERNAL): POSITIVE
RUBELLA ANTIBODY IGG (EXTERNAL): NORMAL
TREPONEMA PALLIDUM ANTIBODY (EXTERNAL): NONREACTIVE

## 2023-04-22 ENCOUNTER — HEALTH MAINTENANCE LETTER (OUTPATIENT)
Age: 38
End: 2023-04-22

## 2023-07-21 LAB — GROUP B STREPTOCOCCUS (EXTERNAL): NEGATIVE

## 2023-08-03 PROBLEM — O32.9XX0 FETAL MALPRESENTATION: Status: ACTIVE | Noted: 2023-08-03

## 2023-08-18 ENCOUNTER — HOSPITAL ENCOUNTER (INPATIENT)
Facility: CLINIC | Age: 38
LOS: 2 days | Discharge: HOME | End: 2023-08-20
Attending: ADVANCED PRACTICE MIDWIFE | Admitting: ADVANCED PRACTICE MIDWIFE
Payer: COMMERCIAL

## 2023-08-18 PROBLEM — Z37.9 NORMAL LABOR: Status: ACTIVE | Noted: 2023-08-18

## 2023-08-18 LAB
ABO/RH(D): NORMAL
ANTIBODY SCREEN: NEGATIVE
HGB BLD-MCNC: 12 G/DL (ref 11.7–15.7)
HOLD SPECIMEN: NORMAL
SPECIMEN EXPIRATION DATE: NORMAL

## 2023-08-18 PROCEDURE — 85018 HEMOGLOBIN: CPT | Performed by: ADVANCED PRACTICE MIDWIFE

## 2023-08-18 PROCEDURE — 722N000001 HC LABOR CARE VAGINAL DELIVERY SINGLE

## 2023-08-18 PROCEDURE — 250N000011 HC RX IP 250 OP 636: Performed by: ADVANCED PRACTICE MIDWIFE

## 2023-08-18 PROCEDURE — 86901 BLOOD TYPING SEROLOGIC RH(D): CPT | Performed by: ADVANCED PRACTICE MIDWIFE

## 2023-08-18 PROCEDURE — 86780 TREPONEMA PALLIDUM: CPT | Performed by: ADVANCED PRACTICE MIDWIFE

## 2023-08-18 PROCEDURE — 86850 RBC ANTIBODY SCREEN: CPT | Performed by: ADVANCED PRACTICE MIDWIFE

## 2023-08-18 PROCEDURE — 250N000013 HC RX MED GY IP 250 OP 250 PS 637: Performed by: ADVANCED PRACTICE MIDWIFE

## 2023-08-18 PROCEDURE — 120N000001 HC R&B MED SURG/OB

## 2023-08-18 PROCEDURE — 36415 COLL VENOUS BLD VENIPUNCTURE: CPT | Performed by: ADVANCED PRACTICE MIDWIFE

## 2023-08-18 RX ORDER — NALOXONE HYDROCHLORIDE 0.4 MG/ML
0.4 INJECTION, SOLUTION INTRAMUSCULAR; INTRAVENOUS; SUBCUTANEOUS
Status: DISCONTINUED | OUTPATIENT
Start: 2023-08-18 | End: 2023-08-18 | Stop reason: HOSPADM

## 2023-08-18 RX ORDER — NALOXONE HYDROCHLORIDE 0.4 MG/ML
0.2 INJECTION, SOLUTION INTRAMUSCULAR; INTRAVENOUS; SUBCUTANEOUS
Status: DISCONTINUED | OUTPATIENT
Start: 2023-08-18 | End: 2023-08-18 | Stop reason: HOSPADM

## 2023-08-18 RX ORDER — PROCHLORPERAZINE MALEATE 10 MG
10 TABLET ORAL EVERY 6 HOURS PRN
Status: DISCONTINUED | OUTPATIENT
Start: 2023-08-18 | End: 2023-08-18 | Stop reason: HOSPADM

## 2023-08-18 RX ORDER — BISACODYL 10 MG
10 SUPPOSITORY, RECTAL RECTAL DAILY PRN
Status: DISCONTINUED | OUTPATIENT
Start: 2023-08-18 | End: 2023-08-20 | Stop reason: HOSPADM

## 2023-08-18 RX ORDER — NALOXONE HYDROCHLORIDE 0.4 MG/ML
0.2 INJECTION, SOLUTION INTRAMUSCULAR; INTRAVENOUS; SUBCUTANEOUS
Status: DISCONTINUED | OUTPATIENT
Start: 2023-08-18 | End: 2023-08-20 | Stop reason: HOSPADM

## 2023-08-18 RX ORDER — OXYCODONE HYDROCHLORIDE 5 MG/1
5 TABLET ORAL EVERY 4 HOURS PRN
Status: DISCONTINUED | OUTPATIENT
Start: 2023-08-18 | End: 2023-08-20 | Stop reason: HOSPADM

## 2023-08-18 RX ORDER — ONDANSETRON 2 MG/ML
4 INJECTION INTRAMUSCULAR; INTRAVENOUS EVERY 6 HOURS PRN
Status: DISCONTINUED | OUTPATIENT
Start: 2023-08-18 | End: 2023-08-18 | Stop reason: HOSPADM

## 2023-08-18 RX ORDER — IBUPROFEN 800 MG/1
800 TABLET, FILM COATED ORAL EVERY 6 HOURS PRN
Status: DISCONTINUED | OUTPATIENT
Start: 2023-08-18 | End: 2023-08-20 | Stop reason: HOSPADM

## 2023-08-18 RX ORDER — METHYLERGONOVINE MALEATE 0.2 MG/ML
200 INJECTION INTRAVENOUS
Status: DISCONTINUED | OUTPATIENT
Start: 2023-08-18 | End: 2023-08-20 | Stop reason: HOSPADM

## 2023-08-18 RX ORDER — KETOROLAC TROMETHAMINE 30 MG/ML
30 INJECTION, SOLUTION INTRAMUSCULAR; INTRAVENOUS
Status: DISCONTINUED | OUTPATIENT
Start: 2023-08-18 | End: 2023-08-20 | Stop reason: HOSPADM

## 2023-08-18 RX ORDER — CARBOPROST TROMETHAMINE 250 UG/ML
250 INJECTION, SOLUTION INTRAMUSCULAR
Status: DISCONTINUED | OUTPATIENT
Start: 2023-08-18 | End: 2023-08-20 | Stop reason: HOSPADM

## 2023-08-18 RX ORDER — METOCLOPRAMIDE 10 MG/1
10 TABLET ORAL EVERY 6 HOURS PRN
Status: DISCONTINUED | OUTPATIENT
Start: 2023-08-18 | End: 2023-08-18 | Stop reason: HOSPADM

## 2023-08-18 RX ORDER — OXYTOCIN/0.9 % SODIUM CHLORIDE 30/500 ML
100-340 PLASTIC BAG, INJECTION (ML) INTRAVENOUS CONTINUOUS PRN
Status: DISCONTINUED | OUTPATIENT
Start: 2023-08-18 | End: 2023-08-20 | Stop reason: HOSPADM

## 2023-08-18 RX ORDER — NALOXONE HYDROCHLORIDE 0.4 MG/ML
0.4 INJECTION, SOLUTION INTRAMUSCULAR; INTRAVENOUS; SUBCUTANEOUS
Status: DISCONTINUED | OUTPATIENT
Start: 2023-08-18 | End: 2023-08-20 | Stop reason: HOSPADM

## 2023-08-18 RX ORDER — IBUPROFEN 800 MG/1
800 TABLET, FILM COATED ORAL
Status: DISCONTINUED | OUTPATIENT
Start: 2023-08-18 | End: 2023-08-20 | Stop reason: HOSPADM

## 2023-08-18 RX ORDER — OXYTOCIN 10 [USP'U]/ML
10 INJECTION, SOLUTION INTRAMUSCULAR; INTRAVENOUS
Status: DISCONTINUED | OUTPATIENT
Start: 2023-08-18 | End: 2023-08-20 | Stop reason: HOSPADM

## 2023-08-18 RX ORDER — PROCHLORPERAZINE 25 MG
25 SUPPOSITORY, RECTAL RECTAL EVERY 12 HOURS PRN
Status: DISCONTINUED | OUTPATIENT
Start: 2023-08-18 | End: 2023-08-18 | Stop reason: HOSPADM

## 2023-08-18 RX ORDER — MISOPROSTOL 200 UG/1
400 TABLET ORAL
Status: DISCONTINUED | OUTPATIENT
Start: 2023-08-18 | End: 2023-08-20 | Stop reason: HOSPADM

## 2023-08-18 RX ORDER — MISOPROSTOL 200 UG/1
800 TABLET ORAL
Status: DISCONTINUED | OUTPATIENT
Start: 2023-08-18 | End: 2023-08-18 | Stop reason: HOSPADM

## 2023-08-18 RX ORDER — CARBOPROST TROMETHAMINE 250 UG/ML
250 INJECTION, SOLUTION INTRAMUSCULAR
Status: DISCONTINUED | OUTPATIENT
Start: 2023-08-18 | End: 2023-08-18 | Stop reason: HOSPADM

## 2023-08-18 RX ORDER — CITRIC ACID/SODIUM CITRATE 334-500MG
30 SOLUTION, ORAL ORAL
Status: DISCONTINUED | OUTPATIENT
Start: 2023-08-18 | End: 2023-08-18 | Stop reason: HOSPADM

## 2023-08-18 RX ORDER — MISOPROSTOL 200 UG/1
400 TABLET ORAL
Status: DISCONTINUED | OUTPATIENT
Start: 2023-08-18 | End: 2023-08-18 | Stop reason: HOSPADM

## 2023-08-18 RX ORDER — METHYLERGONOVINE MALEATE 0.2 MG/ML
200 INJECTION INTRAVENOUS
Status: DISCONTINUED | OUTPATIENT
Start: 2023-08-18 | End: 2023-08-18 | Stop reason: HOSPADM

## 2023-08-18 RX ORDER — HYDROCORTISONE 25 MG/G
CREAM TOPICAL 3 TIMES DAILY PRN
Status: DISCONTINUED | OUTPATIENT
Start: 2023-08-18 | End: 2023-08-20 | Stop reason: HOSPADM

## 2023-08-18 RX ORDER — DOCUSATE SODIUM 100 MG/1
100 CAPSULE, LIQUID FILLED ORAL DAILY
Status: DISCONTINUED | OUTPATIENT
Start: 2023-08-19 | End: 2023-08-20 | Stop reason: HOSPADM

## 2023-08-18 RX ORDER — LIDOCAINE 40 MG/G
CREAM TOPICAL
Status: DISCONTINUED | OUTPATIENT
Start: 2023-08-18 | End: 2023-08-18 | Stop reason: HOSPADM

## 2023-08-18 RX ORDER — ONDANSETRON 4 MG/1
4 TABLET, ORALLY DISINTEGRATING ORAL EVERY 6 HOURS PRN
Status: DISCONTINUED | OUTPATIENT
Start: 2023-08-18 | End: 2023-08-18 | Stop reason: HOSPADM

## 2023-08-18 RX ORDER — ACETAMINOPHEN 325 MG/1
650 TABLET ORAL EVERY 4 HOURS PRN
Status: DISCONTINUED | OUTPATIENT
Start: 2023-08-18 | End: 2023-08-20 | Stop reason: HOSPADM

## 2023-08-18 RX ORDER — OXYTOCIN/0.9 % SODIUM CHLORIDE 30/500 ML
340 PLASTIC BAG, INJECTION (ML) INTRAVENOUS CONTINUOUS PRN
Status: DISCONTINUED | OUTPATIENT
Start: 2023-08-18 | End: 2023-08-18 | Stop reason: HOSPADM

## 2023-08-18 RX ORDER — OXYTOCIN/0.9 % SODIUM CHLORIDE 30/500 ML
340 PLASTIC BAG, INJECTION (ML) INTRAVENOUS CONTINUOUS PRN
Status: DISCONTINUED | OUTPATIENT
Start: 2023-08-18 | End: 2023-08-20 | Stop reason: HOSPADM

## 2023-08-18 RX ORDER — FENTANYL CITRATE 50 UG/ML
50 INJECTION, SOLUTION INTRAMUSCULAR; INTRAVENOUS EVERY 30 MIN PRN
Status: DISCONTINUED | OUTPATIENT
Start: 2023-08-18 | End: 2023-08-18 | Stop reason: HOSPADM

## 2023-08-18 RX ORDER — METOCLOPRAMIDE HYDROCHLORIDE 5 MG/ML
10 INJECTION INTRAMUSCULAR; INTRAVENOUS EVERY 6 HOURS PRN
Status: DISCONTINUED | OUTPATIENT
Start: 2023-08-18 | End: 2023-08-18 | Stop reason: HOSPADM

## 2023-08-18 RX ORDER — OXYTOCIN 10 [USP'U]/ML
10 INJECTION, SOLUTION INTRAMUSCULAR; INTRAVENOUS
Status: COMPLETED | OUTPATIENT
Start: 2023-08-18 | End: 2023-08-18

## 2023-08-18 RX ORDER — OXYTOCIN 10 [USP'U]/ML
10 INJECTION, SOLUTION INTRAMUSCULAR; INTRAVENOUS
Status: DISCONTINUED | OUTPATIENT
Start: 2023-08-18 | End: 2023-08-18 | Stop reason: HOSPADM

## 2023-08-18 RX ORDER — MODIFIED LANOLIN
OINTMENT (GRAM) TOPICAL
Status: DISCONTINUED | OUTPATIENT
Start: 2023-08-18 | End: 2023-08-20 | Stop reason: HOSPADM

## 2023-08-18 RX ORDER — MISOPROSTOL 200 UG/1
800 TABLET ORAL
Status: DISCONTINUED | OUTPATIENT
Start: 2023-08-18 | End: 2023-08-20 | Stop reason: HOSPADM

## 2023-08-18 RX ADMIN — OXYTOCIN 10 UNITS: 10 INJECTION, SOLUTION INTRAMUSCULAR; INTRAVENOUS at 23:07

## 2023-08-18 RX ADMIN — IBUPROFEN 800 MG: 800 TABLET ORAL at 23:48

## 2023-08-18 ASSESSMENT — ACTIVITIES OF DAILY LIVING (ADL)
CONCENTRATING,_REMEMBERING_OR_MAKING_DECISIONS_DIFFICULTY: NO
DIFFICULTY_COMMUNICATING: NO
WALKING_OR_CLIMBING_STAIRS_DIFFICULTY: NO
ADLS_ACUITY_SCORE: 20
HEARING_DIFFICULTY_OR_DEAF: NO
FALL_HISTORY_WITHIN_LAST_SIX_MONTHS: NO
CHANGE_IN_FUNCTIONAL_STATUS_SINCE_ONSET_OF_CURRENT_ILLNESS/INJURY: NO
DRESSING/BATHING_DIFFICULTY: NO
ADLS_ACUITY_SCORE: 20
DOING_ERRANDS_INDEPENDENTLY_DIFFICULTY: NO
DIFFICULTY_EATING/SWALLOWING: NO
ADLS_ACUITY_SCORE: 31
VISION_MANAGEMENT: GLASSES
WEAR_GLASSES_OR_BLIND: YES
TOILETING_ISSUES: NO

## 2023-08-18 NOTE — PROGRESS NOTES
Outpatient/Triage Note:    Patient Name:  Stephy Daniel  :      1985  MRN:      [id number]    Subjective:  Stephy Daniel is a 38 year old  at 40.3 weeks, who presented to [facility] for evaluation of contractions since 100. Denies leaking of fluid, bleeding, or changes in fetal movement. 1.5/50/-3 yesterday. Hx of rapid birth.     Objective:  Vital signs: There were no vitals taken for this visit.  FHR: Category 1, baseline 145, mod variability, accelerations +, decelerations absent  Uterine contractions: 2-3.5, mild-mod    Physical Exam: A&Ox3  Abdomen: SIUP, Verex by ultrasound, abdomen non-tender  SVE: 3-3.5/50/-2  Legs: no edema, moves freely    Results:  No results found for this or any previous visit (from the past 168 hour(s)).    Assessment:   @ 40w3d here for evaluation of contractions since 100.     Plan:   -Will obtain NST and monitor. May be candidate for discharge home vs stay and augment if she would like given hx of rapid birth.     Provider: MYLA Ambriz CNM

## 2023-08-19 LAB — T PALLIDUM AB SER QL: NONREACTIVE

## 2023-08-19 PROCEDURE — 120N000001 HC R&B MED SURG/OB

## 2023-08-19 PROCEDURE — 250N000013 HC RX MED GY IP 250 OP 250 PS 637: Performed by: ADVANCED PRACTICE MIDWIFE

## 2023-08-19 RX ADMIN — IBUPROFEN 800 MG: 800 TABLET, FILM COATED ORAL at 18:05

## 2023-08-19 RX ADMIN — DOCUSATE SODIUM 100 MG: 100 CAPSULE, LIQUID FILLED ORAL at 11:14

## 2023-08-19 RX ADMIN — IBUPROFEN 800 MG: 800 TABLET, FILM COATED ORAL at 06:35

## 2023-08-19 RX ADMIN — IBUPROFEN 800 MG: 800 TABLET, FILM COATED ORAL at 11:15

## 2023-08-19 ASSESSMENT — ACTIVITIES OF DAILY LIVING (ADL)
ADLS_ACUITY_SCORE: 20

## 2023-08-19 NOTE — PROGRESS NOTES
Delivery was completed by [facility] as I was attending another patient's delivery on the unit. Backup [facility] MD also attending delivery at another hospital. Appreciate OB assistance.     MYLA Ambriz CNM on 8/18/2023 at 11:38 PM

## 2023-08-19 NOTE — PLAN OF CARE
at 2300, staff assist called for precipitous delivery. Baby skin to skin after delivery, breastfeeding on demand. Pain controlled with ibuprofen. Up to bathroom at 0130, voided and showered. Ambulating independently. Postpartum assessment WNL, see flowsheets for more information. Spouse at bedside, supportive. Caregiver education and support on-going.    Tab Hicks RN      Problem: Plan of Care - These are the overarching goals to be used throughout the patient stay.    Goal: Plan of Care Review  2023 by Tab Hicks RN  Outcome: Progressing  2023 by Tab Hicks RN  Flowsheets (Taken 2023 010)  Plan of Care Reviewed With: patient  Overall Patient Progress: improving  Goal: Patient-Specific Goal (Individualized)  2023 by Tab Hicks RN  Outcome: Progressing  2023 by Tab Hicks RN  Flowsheets (Taken 2023 010)  Patient/Family-Specific Goals (Include Timeframe): rest, bond with baby  Goal: Absence of Hospital-Acquired Illness or Injury  Outcome: Progressing  Intervention: Prevent Skin Injury  Recent Flowsheet Documentation  Taken 2023 011 by Tab Hicks RN  Body Position: position changed independently  Taken 20234 by Tab Hicks RN  Body Position: position changed independently  Goal: Optimal Comfort and Wellbeing  Outcome: Progressing  Intervention: Monitor Pain and Promote Comfort  Recent Flowsheet Documentation  Taken 2023 2348 by Tab Hicks RN  Pain Management Interventions:   medication (see MAR)   cold applied  Intervention: Provide Person-Centered Care  Recent Flowsheet Documentation  Taken 2023 011 by Tab Hicks RN  Trust Relationship/Rapport:   care explained   choices provided   emotional support provided   empathic listening provided   questions answered   questions encouraged   reassurance provided   thoughts/feelings  acknowledged  Taken 8/18/2023 2134 by Tab Hicks RN  Trust Relationship/Rapport:   care explained   choices provided   emotional support provided   empathic listening provided   questions answered   questions encouraged   reassurance provided   thoughts/feelings acknowledged  Goal: Readiness for Transition of Care  Outcome: Progressing  Intervention: Mutually Develop Transition Plan  Recent Flowsheet Documentation  Taken 8/18/2023 2006 by Tab Hicks RN  Equipment Currently Used at Home: none     Problem: Postpartum (Vaginal Delivery)  Goal: Successful Maternal Role Transition  Outcome: Progressing  Goal: Hemostasis  Outcome: Progressing  Goal: Absence of Infection Signs and Symptoms  Outcome: Progressing  Goal: Anesthesia/Sedation Recovery  Outcome: Progressing  Goal: Optimal Pain Control and Function  Outcome: Progressing  Intervention: Prevent or Manage Pain  Recent Flowsheet Documentation  Taken 8/18/2023 2348 by Tab Hicks RN  Pain Management Interventions:   medication (see MAR)   cold applied  Goal: Effective Urinary Elimination  Outcome: Progressing     Problem: Labor  Goal: Hemostasis  Outcome: Met  Goal: Stable Fetal Wellbeing  Outcome: Met  Intervention: Promote and Monitor Fetal Wellbeing  Recent Flowsheet Documentation  Taken 8/19/2023 0115 by Tab Hicks RN  Body Position: position changed independently  Taken 8/18/2023 2134 by Tab Hicks RN  Body Position: position changed independently  Goal: Effective Progression to Delivery  Outcome: Met  Goal: Absence of Infection Signs and Symptoms  Outcome: Met  Goal: Acceptable Pain Control  Outcome: Met  Goal: Normal Uterine Contraction Pattern  Outcome: Met       Goal Outcome Evaluation:      Plan of Care Reviewed With: patient    Overall Patient Progress: improvingOverall Patient Progress: improving

## 2023-08-19 NOTE — L&D DELIVERY NOTE
OB Vaginal Delivery Note    Stephy Daniel MRN# [id number]   Age: 38 year old YOB: 1985       GA: 40w3d  GP:   Labor Complications: None   EBL: 200   Delivery Type: Vaginal, Spontaneous   ROM to Delivery Time: rupture date or rupture time have not been documented   Weight:     1 Minute 5 Minute 10 Minute   Apgar Totals:            FELIPA HICKS;KAYLA PAZ     Delivery Details:  Stephy Daniel, a 38 year old  female who presented to [facility] care in active labor.  Patient was in the tub and felt an urge to push.  I was called as in house OB.  Patient had a precipitous delivery of a viable male infant over an intact perineum.  Placenta delivered intact and spontaneously.  IM pit given.    Cord complications: none   Placenta delivered at 2023 11:08 PM . Placental disposition was Hospital disposal . Fundal massage performed and fundus found to be firm.     Episiotomy: none    Perineum, vagina, cervix were inspected, and the following lacerations were noted:   Perineal lacerations: none              Infant and patient in delivery room in good and stable condition.        Dm Daniel [[id number]]      Labor Event Times      Latent labor onset date/time: 2023 1700           Labor Events     labor?: No   steroids: None  Labor Type: Spontaneous  Predominate monitoring during 1st stage: continuous electronic fetal monitoring     Antibiotics received during labor?: No     Augmentation: None       Delivery/Placenta Date and Time      Delivery Date: 23 Delivery Time: 10:57 PM   Placenta Date/Time: 2023 11:08 PM  Oxytocin given at the time of delivery: after delivery of baby   Other personnel present at delivery:  Provider Role   Felipa Hicks, Kayla Maya, RN              Vaginal Counts              Needles Suture Needles Sponges (RETIRED) Instruments   Initial counts 0 0 0    Added to count       Relief counts       Final  counts               Placed during labor Accounted for at the end of labor   FSE     IUPC     Cervidil                               Cord      Cord Complications: None               Stem cell collection?: No           Labor Events and Shoulder Dystocia    Fetal Tracing Prior to Delivery: Category 1  Shoulder dystocia present?: Neg       Delivery (Maternal) (Provider to Complete) (085055)    Episiotomy: None  Perineal lacerations: None    Repair suture: None  Genital tract inspection done: Pos       Blood Loss  Mother: Stephy Daniel #[id number]     Start of Mother's Information      Delivery Blood Loss  08/18/23 1057 - 08/18/23 2315      None                 End of Mother's Information  Mother: Stephy Daniel #[id number]                Delivery - Provider to Complete (073820)    Delivery Type (Choose the 1 that will go to the Birth History): Vaginal, Spontaneous                         Other personnel:  Provider Role   Tab Hicks RN [place], Elsa L, RN                     Placenta    Date/Time: 8/18/2023 11:08 PM  Removal: Spontaneous  Disposition: Hospital disposal             Presentation and Position    Presentation: Vertex     Occiput Anterior                     Luz Sotelo MD

## 2023-08-19 NOTE — H&P
"HISTORY AND PHYSICAL UPDATE ADMISSION EXAM    Name: Sade Daniel  YOB: 1985  Medical Record Number: [id number]    History of Present Illness: Sade Daniel is a 38 year old female who is 40w3d pregnant and being admitted for active labor management. Initially 3.5cm in triage, made progress to 4.5cm at which time she was admitted. 1st birth was rapid. Has been jolly today since 0100. Contractions intensified significantly after 1st SVE here.     40.1w growth ultrasound EFW 27.8% 7#1.     Estimated Date of Delivery: Aug 15, 2023    EGA 40w3d    OB History    Para Term  AB Living   2 1 1 0 0 1   SAB IAB Ectopic Multiple Live Births   0 0 0 0 1      # Outcome Date GA Lbr Jet/2nd Weight Sex Delivery Anes PTL Lv   2 Current            1 Term 21 38w4d / 00:06 3.07 kg (6 lb 12.3 oz) M Vag-Spont None N DOMONIQUE      Name: LINH,MALE-SADE      Apgar1: 8  Apgar5: 9        Lab Results   Component Value Date    AS Negative 2021    CHPCRT Negative 2018    GCPCRT Negative 2018    TREPAB Negative 2017       Prenatal Complications:  BMI 37  2. Anxiety and hx PPD- sees therapist  3. ELAINE- resolved  4. 2 small fibroids noted at 16w- fundal pedunculated and left intramural  5. Breech at 36w, vertex spontaneously by 38w  6. AMA- low risk panorama    Exam:      /78 (BP Location: Right arm, Patient Position: Sitting, Cuff Size: Adult Regular)   Temp 98.1  F (36.7  C) (Oral)   Resp 16   Ht 1.651 m (5' 5\")   Wt 92.5 kg (204 lb)   BMI 33.95 kg/m      Fetal heart Rate Category 2 d/t variables  Contractions Q2-4    HEENT grossly normal  Neck: no lymphadenopathy or thryoidomegaly  Lungs breathing through contractions   ABD gravid, non-tender  EXT:  no edema, moves freely  Vaginal exam 80/-1  Membranes: intact  Vertex by ultrasound on admit    Assessment: active labor management    Plan:   Admit - see IP orders  Admit labs  Expectant labor management  Pain medication " if requested  Anticipate     Prenatal record reviewed.    Dr. Morton aware of patient status and remains available for consultation and collaboration as needed.      MYLA Ambriz CNM      2023   8:02 PM

## 2023-08-19 NOTE — PROGRESS NOTES
"Vaginal Delivery Postpartum Day 1    Patient Name:  Stephy Daniel  :      1985  MRN:      [id number]      Assessment:  Normal postpartum course.    Plan:  Continue current care.Discharge home tomorrow.      Subjective:  The patient feels well:  Voiding without difficulty, lochia normal, tolerating normal diet, ambulating without difficulty and passing flatus. Voiding independently without complication. Pain is well controlled with current medications.  The patient has no emotional concerns.  The baby is well and being fed by breast.      YOB: 2023   Birth Time: 11:00 PM     Prenatal Complications include:   BMI 37  2. Anxiety and hx PPD- sees therapist  3. ELAINE- resolved  4. 2 small fibroids noted at 16w- fundal pedunculated and left intramural  5. Breech at 36w, vertex spontaneously by 38w  6. AMA- low risk panorama  Objective:  /62 (BP Location: Right arm, Patient Position: Semi-Jj's, Cuff Size: Adult Regular)   Pulse 60   Temp 98  F (36.7  C) (Oral)   Resp 16   Ht 1.651 m (5' 5\")   Wt 92.5 kg (204 lb)   SpO2 99%   Breastfeeding Unknown   BMI 33.95 kg/m    Patient Vitals for the past 24 hrs:   BP Temp Temp src Pulse Resp SpO2 Height Weight   23 0852 110/62 98  F (36.7  C) Oral -- 16 99 % -- --   23 0530 103/60 98.3  F (36.8  C) Oral 60 16 -- -- --   23 0108 102/64 -- -- -- -- -- -- --   23 0053 112/ -- -- -- -- -- -- --   23 0038 107/64 -- -- -- -- -- -- --   23 0023 105/ -- -- -- -- -- -- --   238 106/ -- -- -- -- -- -- --   233 109/62 98.1  F (36.7  C) Oral -- -- -- -- --   08/18/23 2338 106/61 -- -- -- -- -- -- --   23 2323 119/67 -- -- -- -- -- -- --   23 2134 112/63 98.4  F (36.9  C) Oral 88 16 -- -- --   23 1744 -- -- -- -- -- -- 1.651 m (5' 5\") 92.5 kg (204 lb)   23 1724 131/78 98.1  F (36.7  C) Oral -- 16 -- -- --       Exam: Patient A&O x 3. No acute distress, breathing " unlabored.The amount and color of the lochia is appropriate for the duration of recovery. Uterine fundus is firm at . Perineum: no significant edema      Lab Results   Component Value Date    AS Negative 08/18/2023    CHPCRT Negative 12/26/2018    GCPCRT Negative 12/26/2018    TREPAB Negative 12/29/2017    HGB 12.0 08/18/2023       Immunization History   Administered Date(s) Administered    COVID-19 Bivalent 12+ (Pfizer) 10/25/2022    COVID-19 Monovalent 18+ (Moderna) 12/16/2021    Influenza,INJ,MDCK,PF,Quad >6mo(Flucelvax) 10/05/2022    TDAP (Adacel,Boostrix) 06/26/2006, 12/11/2020, 06/08/2023       Provider:  MYLA Clark CNM    Date:  8/19/2023  Time:  12:30 PM

## 2023-08-19 NOTE — PLAN OF CARE
Problem: Plan of Care - These are the overarching goals to be used throughout the patient stay.    Goal: Optimal Comfort and Wellbeing  Intervention: Provide Person-Centered Care  Recent Flowsheet Documentation  Taken 8/19/2023 0819 by Edison Broderick RN  Trust Relationship/Rapport:   care explained   choices provided   emotional support provided     Problem: Postpartum (Vaginal Delivery)  Goal: Successful Maternal Role Transition  Outcome: Progressing     Problem: Postpartum (Vaginal Delivery)  Goal: Hemostasis  Outcome: Progressing   Goal Outcome Evaluation:

## 2023-08-20 VITALS
DIASTOLIC BLOOD PRESSURE: 65 MMHG | RESPIRATION RATE: 18 BRPM | OXYGEN SATURATION: 97 % | SYSTOLIC BLOOD PRESSURE: 105 MMHG | BODY MASS INDEX: 33.99 KG/M2 | TEMPERATURE: 98.3 F | HEART RATE: 57 BPM | WEIGHT: 204 LBS | HEIGHT: 65 IN

## 2023-08-20 PROBLEM — Z37.9 NORMAL LABOR: Status: RESOLVED | Noted: 2023-08-18 | Resolved: 2023-08-20

## 2023-08-20 PROBLEM — O32.9XX0 FETAL MALPRESENTATION: Status: RESOLVED | Noted: 2023-08-03 | Resolved: 2023-08-20

## 2023-08-20 PROCEDURE — 250N000013 HC RX MED GY IP 250 OP 250 PS 637: Performed by: ADVANCED PRACTICE MIDWIFE

## 2023-08-20 RX ORDER — IBUPROFEN 800 MG/1
800 TABLET, FILM COATED ORAL EVERY 6 HOURS PRN
COMMUNITY
Start: 2023-08-20

## 2023-08-20 RX ORDER — ACETAMINOPHEN 325 MG/1
650 TABLET ORAL EVERY 4 HOURS PRN
COMMUNITY
Start: 2023-08-20

## 2023-08-20 RX ORDER — DOCUSATE SODIUM 100 MG/1
100 CAPSULE, LIQUID FILLED ORAL 2 TIMES DAILY PRN
COMMUNITY
Start: 2023-08-20

## 2023-08-20 RX ADMIN — IBUPROFEN 800 MG: 800 TABLET, FILM COATED ORAL at 00:05

## 2023-08-20 ASSESSMENT — ACTIVITIES OF DAILY LIVING (ADL)
ADLS_ACUITY_SCORE: 20

## 2023-08-20 NOTE — DISCHARGE SUMMARY
OB Discharge Summary      Date:  2023    Name:  Stephy Daniel  :  1985  MRN:  [id number]      Admission Date:  2023  Delivery Date: 2023   Gestational Age at Delivery:  40w3d  Discharge Date:  2023    Principal Diagnosis:    Patient Active Problem List   Diagnosis    Well woman exam    Closed displaced fracture of shaft of left clavicle with routine healing, subsequent encounter    Hx of chlamydia infection     (normal spontaneous vaginal delivery)         Conditions complicating Pregnancy:   BMI 37  2. Anxiety and hx PPD- sees therapist  3. ELAINE- resolved  4. 2 small fibroids noted at 16w- fundal pedunculated and left intramural  5. Breech at 36w, vertex spontaneously by 38w  6. AMA- low risk panorama       Procedure(s) Performed:      Indication for :  N/A  Indication for Induction:  N/A     Condition at Discharge:  Well    Discharge Medications:      Review of your medicines        START taking        Dose / Directions   acetaminophen 325 MG tablet  Commonly known as: TYLENOL      Dose: 650 mg  Take 2 tablets (650 mg) by mouth every 4 hours as needed for mild pain  Refills: 0     docusate sodium 100 MG capsule  Commonly known as: COLACE      Dose: 100 mg  Take 1 capsule (100 mg) by mouth 2 times daily as needed for constipation  Refills: 0     ibuprofen 800 MG tablet  Commonly known as: ADVIL/MOTRIN      Dose: 800 mg  Take 1 tablet (800 mg) by mouth every 6 hours as needed for other (cramping)  Refills: 0            CONTINUE these medicines which have NOT CHANGED        Dose / Directions   Docosahexaenoic Acid 200 MG capsule  Commonly known as: DHA      Dose: 200 mg  Take 200 mg by mouth daily  Refills: 0     ferrous sulfate 325 (65 Fe) MG tablet  Commonly known as: FEROSUL      Dose: 325 mg  Take 325 mg by mouth daily (with breakfast)  Refills: 0     PNV Prenatal Plus Multivitamin 27-1 MG Tabs per tablet      Dose: 1 tablet  Take 1 tablet by mouth daily  Refills: 0                Where to get your medicines        Some of these will need a paper prescription and others can be bought over the counter. Ask your nurse if you have questions.    You don't need a prescription for these medications  acetaminophen 325 MG tablet  docusate sodium 100 MG capsule  ibuprofen 800 MG tablet          Discharge Plan:    Follow up with /YULI:  6 weeks postpartum, sooner with concerns   Patient Instructions:      Physical activity: As tolerated. Nothing in the vagina for 6 weeks.    Diet:  Regular    Medication: As listed above. May alternate ibuprofen and acetaminophen for pain management.    Other:        Physician/CNM: MYLA Ambriz CNM    Name:  Stephy Daniel  :  1985  MRN:  [id number]

## 2023-08-20 NOTE — PLAN OF CARE
Baby breastfeeding on demand every 2-3 hours. Pain controlled with ibuprofen. Up independently, voiding without difficulty. Postpartum assessment WNL, see flowsheets for more information. Spouse at bedside, supportive. Caregiver education and support on-going.    Tab Hicks RN      Problem: Plan of Care - These are the overarching goals to be used throughout the patient stay.    Goal: Plan of Care Review  Outcome: Progressing  Flowsheets (Taken 8/20/2023 0000)  Plan of Care Reviewed With: patient  Overall Patient Progress: improving  Goal: Patient-Specific Goal (Individualized)  Outcome: Progressing  Flowsheets (Taken 8/20/2023 0000)  Patient/Family-Specific Goals (Include Timeframe): rest, breastfeeding  Goal: Absence of Hospital-Acquired Illness or Injury  Outcome: Progressing  Intervention: Prevent Skin Injury  Recent Flowsheet Documentation  Taken 8/20/2023 0005 by Tab Hicks RN  Body Position: position changed independently  Taken 8/19/2023 2030 by Tab Hicks RN  Body Position: position changed independently  Goal: Optimal Comfort and Wellbeing  Outcome: Progressing  Intervention: Monitor Pain and Promote Comfort  Recent Flowsheet Documentation  Taken 8/20/2023 0005 by Tab Hicks RN  Pain Management Interventions: medication (see MAR)  Intervention: Provide Person-Centered Care  Recent Flowsheet Documentation  Taken 8/20/2023 0005 by Tab Hicks RN  Trust Relationship/Rapport:   care explained   choices provided   emotional support provided   empathic listening provided   questions answered   questions encouraged   reassurance provided   thoughts/feelings acknowledged  Taken 8/19/2023 2030 by Tab Hicks RN  Trust Relationship/Rapport:   care explained   choices provided   emotional support provided   empathic listening provided   questions answered   questions encouraged   reassurance provided   thoughts/feelings acknowledged  Goal: Readiness  for Transition of Care  Outcome: Progressing     Problem: Postpartum (Vaginal Delivery)  Goal: Successful Maternal Role Transition  Outcome: Progressing  Goal: Hemostasis  Outcome: Progressing  Goal: Absence of Infection Signs and Symptoms  Outcome: Progressing  Goal: Anesthesia/Sedation Recovery  Outcome: Progressing  Goal: Optimal Pain Control and Function  Outcome: Progressing  Intervention: Prevent or Manage Pain  Recent Flowsheet Documentation  Taken 8/20/2023 0005 by Tab Hicks RN  Pain Management Interventions: medication (see MAR)  Goal: Effective Urinary Elimination  Outcome: Progressing       Goal Outcome Evaluation:      Plan of Care Reviewed With: patient    Overall Patient Progress: improvingOverall Patient Progress: improving

## 2023-08-20 NOTE — DISCHARGE INSTRUCTIONS

## 2023-08-20 NOTE — PROGRESS NOTES
"Vaginal Delivery Postpartum Day 2    Patient Name:  Stephy Daniel  :      1985  MRN:      [id number]      Assessment:  Normal postpartum course.    Plan:  Continue current care. Discharge today.     Subjective:  The patient feels well:  Voiding without difficulty, lochia normal, tolerating normal diet, ambulating without difficulty and passing flatus.  Voiding independently without complication. Pain is well controlled with current medications.  The patient has no emotional concerns.  The baby is well and being fed by breast. Baby with biliblanket, awaiting AM bili level and plan for baby.     YOB: 2023   Birth Time: 11:00 PM     Prenatal complications:  BMI 37  2. Anxiety and hx PPD- sees therapist  3. ELAINE- resolved  4. 2 small fibroids noted at 16w- fundal pedunculated and left intramural  5. Breech at 36w, vertex spontaneously by 38w  6. AMA- low risk panorama       Objective:  BP 98/62 (BP Location: Left arm, Patient Position: Semi-Jj's, Cuff Size: Adult Regular)   Pulse 66   Temp 98.4  F (36.9  C) (Oral)   Resp 16   Ht 1.651 m (5' 5\")   Wt 92.5 kg (204 lb)   SpO2 97%   Breastfeeding Unknown   BMI 33.95 kg/m      .  Patient Vitals for the past 24 hrs:   BP Temp Temp src Pulse Resp SpO2   23 0005 98/62 98.4  F (36.9  C) Oral 66 16 --   23 1834 105/61 98.2  F (36.8  C) Oral 72 16 97 %   23 1500 102/61 98.2  F (36.8  C) Oral 76 16 96 %       Exam: Patient A&O x 3. No acute distress, breathing unlabored.The amount and color of the lochia is appropriate for the duration of recovery. Nursing notes reviewed.     Lab Results   Component Value Date    AS Negative 2023    CHPCRT Negative 2018    GCPCRT Negative 2018    TREPAB Negative 2017    HGB 12.0 2023       Immunization History   Administered Date(s) Administered    COVID-19 Bivalent 12+ (Pfizer) 10/25/2022    COVID-19 Monovalent 18+ (Moderna) 2021    Influenza,INJ,MDCK,PF,Quad " >6mo(Flucelvax) 10/05/2022    TDAP (Adacel,Boostrix) 06/26/2006, 12/11/2020, 06/08/2023       Provider:  MYLA Ambriz CNM    Date:  8/20/2023  Time:  8:54 AM

## 2023-08-20 NOTE — PLAN OF CARE
Problem: Plan of Care - These are the overarching goals to be used throughout the patient stay.    Goal: Plan of Care Review  Description: The Plan of Care Review/Shift note should be completed every shift.  The Outcome Evaluation is a brief statement about your assessment that the patient is improving, declining, or no change.  This information will be displayed automatically on your shift note.  Outcome: Met   Goal Outcome Evaluation:       Patient ready for discharge. States understanding of discharge instructions.

## 2024-06-29 ENCOUNTER — HEALTH MAINTENANCE LETTER (OUTPATIENT)
Age: 39
End: 2024-06-29

## 2024-11-25 ENCOUNTER — APPOINTMENT (OUTPATIENT)
Dept: CT IMAGING | Facility: CLINIC | Age: 39
End: 2024-11-25
Attending: EMERGENCY MEDICINE
Payer: COMMERCIAL

## 2024-11-25 ENCOUNTER — HOSPITAL ENCOUNTER (EMERGENCY)
Facility: CLINIC | Age: 39
Discharge: HOME | End: 2024-11-26
Attending: EMERGENCY MEDICINE | Admitting: EMERGENCY MEDICINE
Payer: COMMERCIAL

## 2024-11-25 DIAGNOSIS — R11.2 NAUSEA AND VOMITING, UNSPECIFIED VOMITING TYPE: ICD-10-CM

## 2024-11-25 DIAGNOSIS — R42 DIZZINESS: ICD-10-CM

## 2024-11-25 DIAGNOSIS — H53.9 VISUAL DISTURBANCE: ICD-10-CM

## 2024-11-25 LAB
ALBUMIN SERPL BCG-MCNC: 4.5 G/DL (ref 3.5–5.2)
ALP SERPL-CCNC: 81 U/L (ref 40–150)
ALT SERPL W P-5'-P-CCNC: 14 U/L (ref 0–50)
ANION GAP SERPL CALCULATED.3IONS-SCNC: 12 MMOL/L (ref 7–15)
APTT PPP: 28 SECONDS (ref 22–38)
AST SERPL W P-5'-P-CCNC: 21 U/L (ref 0–45)
ATRIAL RATE - MUSE: 83 BPM
BASOPHILS # BLD AUTO: 0 10E3/UL (ref 0–0.2)
BASOPHILS NFR BLD AUTO: 0 %
BILIRUB SERPL-MCNC: 0.6 MG/DL
BUN SERPL-MCNC: 15.6 MG/DL (ref 6–20)
CALCIUM SERPL-MCNC: 9 MG/DL (ref 8.8–10.4)
CHLORIDE SERPL-SCNC: 101 MMOL/L (ref 98–107)
CREAT SERPL-MCNC: 0.69 MG/DL (ref 0.51–0.95)
DIASTOLIC BLOOD PRESSURE - MUSE: NORMAL MMHG
EGFRCR SERPLBLD CKD-EPI 2021: >90 ML/MIN/1.73M2
EOSINOPHIL # BLD AUTO: 0.1 10E3/UL (ref 0–0.7)
EOSINOPHIL NFR BLD AUTO: 1 %
ERYTHROCYTE [DISTWIDTH] IN BLOOD BY AUTOMATED COUNT: 13.1 % (ref 10–15)
GLUCOSE SERPL-MCNC: 103 MG/DL (ref 70–99)
HCG SERPL QL: NEGATIVE
HCO3 SERPL-SCNC: 26 MMOL/L (ref 22–29)
HCT VFR BLD AUTO: 36.5 % (ref 35–47)
HGB BLD-MCNC: 12.5 G/DL (ref 11.7–15.7)
IMM GRANULOCYTES # BLD: 0 10E3/UL
IMM GRANULOCYTES NFR BLD: 0 %
INR PPP: 1 (ref 0.85–1.15)
INTERPRETATION ECG - MUSE: NORMAL
LYMPHOCYTES # BLD AUTO: 3.7 10E3/UL (ref 0.8–5.3)
LYMPHOCYTES NFR BLD AUTO: 48 %
MAGNESIUM SERPL-MCNC: 2.1 MG/DL (ref 1.7–2.3)
MCH RBC QN AUTO: 30.3 PG (ref 26.5–33)
MCHC RBC AUTO-ENTMCNC: 34.2 G/DL (ref 31.5–36.5)
MCV RBC AUTO: 89 FL (ref 78–100)
MONOCYTES # BLD AUTO: 0.5 10E3/UL (ref 0–1.3)
MONOCYTES NFR BLD AUTO: 7 %
NEUTROPHILS # BLD AUTO: 3.3 10E3/UL (ref 1.6–8.3)
NEUTROPHILS NFR BLD AUTO: 43 %
NRBC # BLD AUTO: 0 10E3/UL
NRBC BLD AUTO-RTO: 0 /100
P AXIS - MUSE: 52 DEGREES
PLATELET # BLD AUTO: 246 10E3/UL (ref 150–450)
POTASSIUM SERPL-SCNC: 3.5 MMOL/L (ref 3.4–5.3)
PR INTERVAL - MUSE: 192 MS
PROT SERPL-MCNC: 7.3 G/DL (ref 6.4–8.3)
QRS DURATION - MUSE: 96 MS
QT - MUSE: 380 MS
QTC - MUSE: 446 MS
R AXIS - MUSE: 67 DEGREES
RBC # BLD AUTO: 4.12 10E6/UL (ref 3.8–5.2)
SODIUM SERPL-SCNC: 139 MMOL/L (ref 135–145)
SYSTOLIC BLOOD PRESSURE - MUSE: NORMAL MMHG
T AXIS - MUSE: 55 DEGREES
TROPONIN T SERPL HS-MCNC: <6 NG/L
TSH SERPL DL<=0.005 MIU/L-ACNC: 3.49 UIU/ML (ref 0.3–4.2)
VENTRICULAR RATE- MUSE: 83 BPM
WBC # BLD AUTO: 7.7 10E3/UL (ref 4–11)

## 2024-11-25 PROCEDURE — 36415 COLL VENOUS BLD VENIPUNCTURE: CPT | Performed by: EMERGENCY MEDICINE

## 2024-11-25 PROCEDURE — 250N000011 HC RX IP 250 OP 636: Performed by: EMERGENCY MEDICINE

## 2024-11-25 PROCEDURE — 84703 CHORIONIC GONADOTROPIN ASSAY: CPT | Performed by: EMERGENCY MEDICINE

## 2024-11-25 PROCEDURE — 84484 ASSAY OF TROPONIN QUANT: CPT | Performed by: EMERGENCY MEDICINE

## 2024-11-25 PROCEDURE — 80053 COMPREHEN METABOLIC PANEL: CPT | Performed by: EMERGENCY MEDICINE

## 2024-11-25 PROCEDURE — 85041 AUTOMATED RBC COUNT: CPT | Performed by: EMERGENCY MEDICINE

## 2024-11-25 PROCEDURE — 85610 PROTHROMBIN TIME: CPT | Performed by: EMERGENCY MEDICINE

## 2024-11-25 PROCEDURE — 93005 ELECTROCARDIOGRAM TRACING: CPT | Performed by: EMERGENCY MEDICINE

## 2024-11-25 PROCEDURE — 85730 THROMBOPLASTIN TIME PARTIAL: CPT | Performed by: EMERGENCY MEDICINE

## 2024-11-25 PROCEDURE — 83735 ASSAY OF MAGNESIUM: CPT | Performed by: EMERGENCY MEDICINE

## 2024-11-25 PROCEDURE — 99285 EMERGENCY DEPT VISIT HI MDM: CPT | Mod: 25

## 2024-11-25 PROCEDURE — 84443 ASSAY THYROID STIM HORMONE: CPT | Performed by: EMERGENCY MEDICINE

## 2024-11-25 PROCEDURE — 85004 AUTOMATED DIFF WBC COUNT: CPT | Performed by: EMERGENCY MEDICINE

## 2024-11-25 PROCEDURE — 70496 CT ANGIOGRAPHY HEAD: CPT

## 2024-11-25 RX ORDER — IOPAMIDOL 755 MG/ML
75 INJECTION, SOLUTION INTRAVASCULAR ONCE
Status: COMPLETED | OUTPATIENT
Start: 2024-11-25 | End: 2024-11-25

## 2024-11-25 RX ADMIN — IOPAMIDOL 75 ML: 755 INJECTION, SOLUTION INTRAVENOUS at 22:07

## 2024-11-25 ASSESSMENT — COLUMBIA-SUICIDE SEVERITY RATING SCALE - C-SSRS
2. HAVE YOU ACTUALLY HAD ANY THOUGHTS OF KILLING YOURSELF IN THE PAST MONTH?: NO
6. HAVE YOU EVER DONE ANYTHING, STARTED TO DO ANYTHING, OR PREPARED TO DO ANYTHING TO END YOUR LIFE?: NO
1. IN THE PAST MONTH, HAVE YOU WISHED YOU WERE DEAD OR WISHED YOU COULD GO TO SLEEP AND NOT WAKE UP?: NO

## 2024-11-25 ASSESSMENT — ACTIVITIES OF DAILY LIVING (ADL)
ADLS_ACUITY_SCORE: 44
ADLS_ACUITY_SCORE: 44

## 2024-11-26 VITALS
HEART RATE: 71 BPM | DIASTOLIC BLOOD PRESSURE: 57 MMHG | SYSTOLIC BLOOD PRESSURE: 100 MMHG | OXYGEN SATURATION: 96 % | HEIGHT: 67 IN | WEIGHT: 191 LBS | RESPIRATION RATE: 15 BRPM | BODY MASS INDEX: 29.98 KG/M2 | TEMPERATURE: 97.4 F

## 2024-11-26 PROCEDURE — 96374 THER/PROPH/DIAG INJ IV PUSH: CPT | Mod: 59

## 2024-11-26 PROCEDURE — 250N000011 HC RX IP 250 OP 636: Performed by: EMERGENCY MEDICINE

## 2024-11-26 RX ORDER — ONDANSETRON 4 MG/1
4 TABLET, ORALLY DISINTEGRATING ORAL EVERY 6 HOURS PRN
Qty: 10 TABLET | Refills: 0 | Status: SHIPPED | OUTPATIENT
Start: 2024-11-26 | End: 2024-12-03

## 2024-11-26 RX ORDER — ONDANSETRON 2 MG/ML
2 INJECTION INTRAMUSCULAR; INTRAVENOUS ONCE
Status: COMPLETED | OUTPATIENT
Start: 2024-11-26 | End: 2024-11-26

## 2024-11-26 RX ADMIN — ONDANSETRON 2 MG: 2 INJECTION, SOLUTION INTRAMUSCULAR; INTRAVENOUS at 00:15

## 2024-11-26 ASSESSMENT — ENCOUNTER SYMPTOMS
SPEECH DIFFICULTY: 1
WEAKNESS: 1

## 2024-11-26 ASSESSMENT — ACTIVITIES OF DAILY LIVING (ADL): ADLS_ACUITY_SCORE: 44

## 2024-11-26 NOTE — ED TRIAGE NOTES
Patient presents to the ED with blurry vision/spotty vision that started around 730pm when she was getting in the shower. Patient states she had seen spots in her periphery. Patient then felt like she couldn't walk/stand/move her extremities and  had to help her out of the shower. Patient's  also reports of speech slurring. The incident lasted about 20 minutes.

## 2024-11-26 NOTE — ED NOTES
Introduced self to patient. Whiteboard updated. Plan of care and length of time discussed with patient. Pain assessed. Family at bedside

## 2024-11-26 NOTE — ED PROVIDER NOTES
NAME: Stephy Daniel  AGE: 39 year old female  YOB: 1985  MRN: [id number]  EVALUATION DATE & TIME: 2024  9:18 PM    PCP: No Ref-Primary, Physician    ED PROVIDER: Ramón Sharp M.D.      Chief Complaint   Patient presents with    Eye Problem    Headache     FINAL IMPRESSION:  1. Dizziness    2. Visual disturbance    3. Nausea and vomiting, unspecified vomiting type      MEDICAL DECISION MAKIN:01 PM I met with the patient, obtained history, performed an initial exam, and discussed options and plan for diagnostics and treatment here in the ED. patient seen in triage area due to call for physician neuroceli.  Patient was clinically assessed and consented to treatment. After assessment, medical decision making and workup were discussed with the patient. The patient was agreeable to plan for testing, workup, and treatment. Pertinent Labs & Imaging studies reviewed. (See chart for details).  10:45 PM I checked on the patient and she reports feeling better, but she notes feeling nauseous after receiving CT Contrast.         Medical Decision Making  Obtained supplemental history:Supplemental history obtained?: Documented in chart  Reviewed external records: External records reviewed?: Documented in chart  Care impacted by chronic illness:Documented in Chart  Care significantly affected by social determinants of health:Access to Medical Care  Did you consider but not order tests?: In addition to work-up documented, I considered the following work up:   Did you interpret images independently?: Independent interpretation of ECG and images noted in documentation, when applicable.  Consultation discussion with other provider:Did you involve another provider (consultant, , pharmacy, etc.)?: No  Discharge. I prescribed additional prescription strength medication(s) as charted. See documentation for any additional details.  Not Applicable    Stephy Daniel is a 39 year old female who presents  with visual disturbance, dizziness, headache.   Differential diagnosis includes but not limited to CVA, TIA, intracranial hemorrhage, vertebral dissection, gastroenteritis.  Patient is otherwise healthy 39-year-old female who presents for episode of spots in her vision along with headache and dizziness.  Patient also complains of spots in her vision but presently there are no visual field deficits, visual field seem to be completely intact on both sides.  Patient has no symptoms now except for after being in the ER for little bit she did complain about a 2/10 headache.  Patient has been well for the last 2 hours since getting out of shower and at this time I would not consider this CVA or stroke code activation.  Will proceed with workup and main concern would be for vasovagal episode possibly or would consider remote possibility of vertebral dissection given that she has had past clavicle surgery and neck injury with scar tissue but very unlikely since this is a remote history of injury.  She also did not have significant neck tenderness or any symptoms presently and unlikely that it would fluctuate.  Patient had EKG which was unremarkable, labs that showed normal troponin, unremarkable CBC, unremarkable metabolic panel and unremarkable TSH.  Patient's pregnancy test was negative and patient appears otherwise well on labs and CT was completely negative.  Patient reassured after this and she reports no further symptoms.  We did give her some water and attempt road test which she did ambulate without difficulty but then felt nauseous and vomited the water.  She does not initially wish anything for the nausea and patient was watched to make sure this is resolved.  She does not report any headache or any significant symptoms of neurologic deficits nor any vertigo at this time.  Patient reports that both of her children are sick with similar symptoms at home but she has not been sick.  Is possible she caught a viral  illness though labs are not showing it and she has no abdominal pain.  After another bout of nausea with some vomiting she did agree to a small dose of Zofran but only wished half a dose of 2 mg IV to help with her nausea.  She would like to try to go home and at this time is ambulating without difficulty and no red flags or neurologic deficits will be plan for discharge home following negative CTA negative lab workup.  Patient was given a prescription for Zofran if she went to fill it for nausea for home.    0 minutes of critical care time    MEDICATIONS GIVEN IN THE EMERGENCY:  Medications   iopamidol (ISOVUE-370) solution 75 mL (75 mLs Intravenous $Given 11/25/24 2527)   ondansetron (ZOFRAN) injection 2 mg (2 mg Intravenous $Given 11/26/24 0013)       NEW PRESCRIPTIONS STARTED AT TODAY'S ER VISIT:  Discharge Medication List as of 11/26/2024  1:09 AM        START taking these medications    Details   ondansetron (ZOFRAN ODT) 4 MG ODT tab Take 1 tablet (4 mg) by mouth every 6 hours as needed for nausea or vomiting., Disp-10 tablet, R-0, Local Print                =================================================================    HPI    Patient information was obtained from: The patient    Use of : N/A       Stephy Daniel is a 39 year old female with no past medical history, who presents with eye problem and headache.    The patient began experiencing visual disturbance around 7:30 PM tonight as she was getting in the shower. She reports there were spots along her peripheral vision. She also had difficulty moving her extremities, as if she was feeling out of her body, and which required her  to assist her out of the shower. Her  also notes she was slurring her speech that had lasted approximately 15 minutes at that time but have since resolved.  Patient reports sort of a dizzy or feeling then but did not faint or throw up.  She maybe felt some nausea at that time but no chest pain, no  shortness of breath, no abdominal pain.  Patient reports both of their children are sick at home with colds and gastrointestinal symptoms.  After the 15-minute episode and getting out of the shower with 's help but she has felt normal since and reports feeling normal here.  Which is now just over 2 hours since episode started. No complaints of any other associated symptoms.    REVIEW OF SYSTEMS   Review of Systems   Eyes:  Positive for visual disturbance (spots along peripheral vision).   Neurological:  Positive for speech difficulty (slurring) and weakness.   All other systems reviewed and are negative.       PAST MEDICAL HISTORY:  Past Medical History:   Diagnosis Date    Abnormal Pap smear of cervix     NO ACTIVE PROBLEMS     Varicose vein of leg        PAST SURGICAL HISTORY:  Past Surgical History:   Procedure Laterality Date    CLAVICLE SURGERY      ORTHOPEDIC SURGERY  6/2017    clavicle fracture, internal fixation with metal plate    TONSILLECTOMY      TONSILLECTOMY         CURRENT MEDICATIONS:    No current facility-administered medications for this encounter.    Current Outpatient Medications:     ondansetron (ZOFRAN ODT) 4 MG ODT tab, Take 1 tablet (4 mg) by mouth every 6 hours as needed for nausea or vomiting., Disp: 10 tablet, Rfl: 0    acetaminophen (TYLENOL) 325 MG tablet, Take 2 tablets (650 mg) by mouth every 4 hours as needed for mild pain, Disp: , Rfl:     Docosahexaenoic Acid (DHA) 200 MG capsule, Take 200 mg by mouth daily, Disp: , Rfl:     docusate sodium (COLACE) 100 MG capsule, Take 1 capsule (100 mg) by mouth 2 times daily as needed for constipation, Disp: , Rfl:     ferrous sulfate (FEROSUL) 325 (65 Fe) MG tablet, Take 325 mg by mouth daily (with breakfast), Disp: , Rfl:     ibuprofen (ADVIL/MOTRIN) 800 MG tablet, Take 1 tablet (800 mg) by mouth every 6 hours as needed for other (cramping), Disp: , Rfl:     Prenatal Vit-Fe Fumarate-FA (PNV PRENATAL PLUS MULTIVITAMIN) 27-1 MG TABS per  "tablet, Take 1 tablet by mouth daily, Disp: , Rfl:     ALLERGIES:  Allergies   Allergen Reactions    Amoxicillin Rash       FAMILY HISTORY:  Family History   Problem Relation Age of Onset    Other Cancer Maternal Grandmother         Melanoma - skin cancer runs in the maternal side of my family    Breast Cancer No family hx of     Colon Cancer No family hx of     Diabetes No family hx of     Thyroid Disease No family hx of        SOCIAL HISTORY:   Social History     Socioeconomic History    Marital status:    Tobacco Use    Smoking status: Never    Smokeless tobacco: Never   Substance and Sexual Activity    Alcohol use: Not Currently     Alcohol/week: 1.0 - 2.0 standard drink of alcohol     Comment: social, occasional 1-2 drinks per week.    Drug use: Never    Sexual activity: Yes     Partners: Male     Birth control/protection: Natural Family Planning   Social History Narrative    How much exercise per week? 3 days    How much calcium per day? In foods        How much caffeine per day?  Occasional tea    How much vitamin D per day? supplement    Do you/your family wear seatbelts?  Yes    Do you/your family use safety helmets? Yes    Do you/your family use sunscreen? Yes    Do you/your family keep firearms in the home? No    Do you/your family have a smoke detector(s)? Yes        December 21, 2017 Pam Houser LPN    Li Guzman, CMA on 12/26/2018 at 8:14 AM        Reviewed cmckim lpn 12-     Social Drivers of Health      Received from [facility]    Intimate Partner Violence       PHYSICAL EXAM:    Vitals: /57   Pulse 71   Temp 97.4  F (36.3  C) (Oral)   Resp 15   Ht 1.702 m (5' 7\")   Wt 86.6 kg (191 lb)   LMP 11/12/2024   SpO2 96%   BMI 29.91 kg/m     Physical Exam  Vitals and nursing note reviewed.   Constitutional:       General: She is not in acute distress.     Appearance: Normal appearance. She is normal weight. She is not ill-appearing or toxic-appearing.   HENT:      Head: " Normocephalic and atraumatic.      Mouth/Throat:      Mouth: Mucous membranes are moist.   Eyes:      General: No visual field deficit.     Extraocular Movements: Extraocular movements intact.      Conjunctiva/sclera: Conjunctivae normal.      Pupils: Pupils are equal, round, and reactive to light.   Cardiovascular:      Rate and Rhythm: Normal rate and regular rhythm.      Heart sounds: Normal heart sounds.   Pulmonary:      Effort: Pulmonary effort is normal. No respiratory distress.      Breath sounds: Normal breath sounds.   Abdominal:      Palpations: Abdomen is soft.      Tenderness: There is no abdominal tenderness.   Musculoskeletal:         General: No signs of injury.      Cervical back: Normal range of motion and neck supple. No rigidity or tenderness.   Skin:     General: Skin is warm and dry.      Capillary Refill: Capillary refill takes less than 2 seconds.      Coloration: Skin is not pale.      Findings: No erythema or rash.   Neurological:      General: No focal deficit present.      Mental Status: She is alert and oriented to person, place, and time.      Cranial Nerves: No cranial nerve deficit, dysarthria or facial asymmetry.      Sensory: No sensory deficit.      Motor: No weakness, tremor, abnormal muscle tone or seizure activity.      Coordination: Coordination normal. Finger-Nose-Finger Test normal.      Gait: Gait normal.   Psychiatric:         Behavior: Behavior normal.      National [employer] Stroke Scale  Exam Interval: Baseline   Score    Level of consciousness: (0)   Alert, keenly responsive    LOC questions: (0)   Answers both questions correctly    LOC commands: (0)   Performs both tasks correctly    Best gaze: (0)   Normal    Visual: (0)   No visual loss    Facial palsy: (0)   Normal symmetrical movements    Motor arm (left): (0)   No drift    Motor arm (right): (0)   No drift    Motor leg (left): (0)   No drift    Motor leg (right): (0)   No drift    Limb ataxia: (0)    Absent    Sensory: (0)   Normal- no sensory loss    Best language: (0)   Normal- no aphasia    Dysarthria: (0)   Normal    Extinction and inattention: (0)   No abnormality        Total Score:  0          LAB:  All pertinent labs reviewed and interpreted.  Labs Ordered and Resulted from Time of ED Arrival to Time of ED Departure   COMPREHENSIVE METABOLIC PANEL - Abnormal       Result Value    Sodium 139      Potassium 3.5      Carbon Dioxide (CO2) 26      Anion Gap 12      Urea Nitrogen 15.6      Creatinine 0.69      GFR Estimate >90      Calcium 9.0      Chloride 101      Glucose 103 (*)     Alkaline Phosphatase 81      AST 21      ALT 14      Protein Total 7.3      Albumin 4.5      Bilirubin Total 0.6     INR - Normal    INR 1.00     PARTIAL THROMBOPLASTIN TIME - Normal    aPTT 28     TROPONIN T, HIGH SENSITIVITY - Normal    Troponin T, High Sensitivity <6     TSH WITH FREE T4 REFLEX - Normal    TSH 3.49     MAGNESIUM - Normal    Magnesium 2.1     HCG QUALITATIVE PREGNANCY - Normal    hCG Serum Qualitative Negative     CBC WITH PLATELETS AND DIFFERENTIAL    WBC Count 7.7      RBC Count 4.12      Hemoglobin 12.5      Hematocrit 36.5      MCV 89      MCH 30.3      MCHC 34.2      RDW 13.1      Platelet Count 246      % Neutrophils 43      % Lymphocytes 48      % Monocytes 7      % Eosinophils 1      % Basophils 0      % Immature Granulocytes 0      NRBCs per 100 WBC 0      Absolute Neutrophils 3.3      Absolute Lymphocytes 3.7      Absolute Monocytes 0.5      Absolute Eosinophils 0.1      Absolute Basophils 0.0      Absolute Immature Granulocytes 0.0      Absolute NRBCs 0.0         RADIOLOGY:  CTA Head Neck with Contrast   Final Result   IMPRESSION:    HEAD CT:   1.  Normal head CT.      HEAD CTA:    1.  Normal CTA [place] [facility] [place].      NECK CTA:   1.  Normal neck CTA.          EKG:   Performed at: 11/25/2024 at 21:23  Impression: Sinus rhythm, normal EKG, no signs of acute ST elevation ischemia or arrhythmia such as  atrial fibrillation.  Rate: 83 bpm  Rhythm: Sinus rhythm  QRS Interval: 96 ms  QTc Interval: 67 ms  Comparison: No previous ECGs available.    I have independently reviewed and interpreted the EKG(s) documented above.     PROCEDURES:   Procedures       I, Satish Muniz, am serving as a scribe to document services personally performed by Dr. Ramón Sharp  based on my observation and the provider's statements to me. I, Ramón Sharp MD attest that Satish Muniz is acting in a scribe capacity, has observed my performance of the services and has documented them in accordance with my direction.      Ramón Sharp M.D.  Emergency Medicine  [facility] Emergency Department       Ramón Sharp MD  11/26/24 0358

## 2024-11-26 NOTE — ED NOTES
Able to tolerate sips of oral fluids. No dizziness, nausea, blurring of vision observed on ambulation trial. Provider notified

## 2025-04-20 ENCOUNTER — HEALTH MAINTENANCE LETTER (OUTPATIENT)
Age: 40
End: 2025-04-20

## 2025-07-12 ENCOUNTER — HEALTH MAINTENANCE LETTER (OUTPATIENT)
Age: 40
End: 2025-07-12